# Patient Record
Sex: MALE | Race: WHITE | NOT HISPANIC OR LATINO | Employment: FULL TIME | RURAL
[De-identification: names, ages, dates, MRNs, and addresses within clinical notes are randomized per-mention and may not be internally consistent; named-entity substitution may affect disease eponyms.]

---

## 2021-05-03 ENCOUNTER — OFFICE VISIT (OUTPATIENT)
Dept: FAMILY MEDICINE | Facility: CLINIC | Age: 41
End: 2021-05-03
Payer: COMMERCIAL

## 2021-05-03 VITALS
HEIGHT: 72 IN | WEIGHT: 261 LBS | OXYGEN SATURATION: 97 % | BODY MASS INDEX: 35.35 KG/M2 | TEMPERATURE: 98 F | RESPIRATION RATE: 16 BRPM | SYSTOLIC BLOOD PRESSURE: 141 MMHG | DIASTOLIC BLOOD PRESSURE: 85 MMHG | HEART RATE: 69 BPM

## 2021-05-03 DIAGNOSIS — M10.9 GOUT, UNSPECIFIED CAUSE, UNSPECIFIED CHRONICITY, UNSPECIFIED SITE: Primary | ICD-10-CM

## 2021-05-03 DIAGNOSIS — R53.83 FATIGUE, UNSPECIFIED TYPE: ICD-10-CM

## 2021-05-03 LAB
25(OH)D3 SERPL-MCNC: 10.2 NG/ML
ALBUMIN SERPL BCP-MCNC: 3.8 G/DL (ref 3.5–5)
ALBUMIN/GLOB SERPL: 1 {RATIO}
ALP SERPL-CCNC: 72 U/L (ref 45–115)
ALT SERPL W P-5'-P-CCNC: 48 U/L (ref 16–61)
ANION GAP SERPL CALCULATED.3IONS-SCNC: 12 MMOL/L (ref 7–16)
AST SERPL W P-5'-P-CCNC: 22 U/L (ref 15–37)
BASOPHILS # BLD AUTO: 0.06 K/UL (ref 0–0.2)
BASOPHILS NFR BLD AUTO: 0.5 % (ref 0–1)
BILIRUB SERPL-MCNC: 0.5 MG/DL (ref 0–1.2)
BILIRUB UR QL STRIP: NEGATIVE
BUN SERPL-MCNC: 10 MG/DL (ref 7–18)
BUN/CREAT SERPL: 9 (ref 6–20)
CALCIUM SERPL-MCNC: 8.9 MG/DL (ref 8.5–10.1)
CHLORIDE SERPL-SCNC: 108 MMOL/L (ref 98–107)
CHOLEST SERPL-MCNC: 228 MG/DL (ref 0–200)
CHOLEST/HDLC SERPL: 4.7 {RATIO}
CLARITY UR: CLEAR
CO2 SERPL-SCNC: 24 MMOL/L (ref 21–32)
COLOR UR: YELLOW
CREAT SERPL-MCNC: 1.1 MG/DL (ref 0.7–1.3)
DIFFERENTIAL METHOD BLD: ABNORMAL
EOSINOPHIL # BLD AUTO: 0.13 K/UL (ref 0–0.5)
EOSINOPHIL NFR BLD AUTO: 1.1 % (ref 1–4)
ERYTHROCYTE [DISTWIDTH] IN BLOOD BY AUTOMATED COUNT: 13.2 % (ref 11.5–14.5)
GLOBULIN SER-MCNC: 4 G/DL (ref 2–4)
GLUCOSE SERPL-MCNC: 126 MG/DL (ref 74–106)
GLUCOSE UR STRIP-MCNC: 100 MG/DL
HCT VFR BLD AUTO: 45.9 % (ref 40–54)
HDLC SERPL-MCNC: 49 MG/DL (ref 40–60)
HGB BLD-MCNC: 14.9 G/DL (ref 13.5–18)
IMM GRANULOCYTES # BLD AUTO: 0.09 K/UL (ref 0–0.04)
IMM GRANULOCYTES NFR BLD: 0.7 % (ref 0–0.4)
KETONES UR STRIP-SCNC: NEGATIVE MG/DL
LDLC SERPL CALC-MCNC: 119 MG/DL
LDLC/HDLC SERPL: 2.4 {RATIO}
LEUKOCYTE ESTERASE UR QL STRIP: NEGATIVE
LYMPHOCYTES # BLD AUTO: 3.09 K/UL (ref 1–4.8)
LYMPHOCYTES NFR BLD AUTO: 25.6 % (ref 27–41)
MCH RBC QN AUTO: 28.7 PG (ref 27–31)
MCHC RBC AUTO-ENTMCNC: 32.5 G/DL (ref 32–36)
MCV RBC AUTO: 88.4 FL (ref 80–96)
MONOCYTES # BLD AUTO: 1.16 K/UL (ref 0–0.8)
MONOCYTES NFR BLD AUTO: 9.6 % (ref 2–6)
MPC BLD CALC-MCNC: 11.4 FL (ref 9.4–12.4)
NEUTROPHILS # BLD AUTO: 7.53 K/UL (ref 1.8–7.7)
NEUTROPHILS NFR BLD AUTO: 62.5 % (ref 53–65)
NITRITE UR QL STRIP: NEGATIVE
NONHDLC SERPL-MCNC: 179 MG/DL
NRBC # BLD AUTO: 0 X10E3/UL
NRBC, AUTO (.00): 0 %
PH UR STRIP: 5.5 PH UNITS
PLATELET # BLD AUTO: 255 K/UL (ref 150–400)
POTASSIUM SERPL-SCNC: 3.3 MMOL/L (ref 3.5–5.1)
PROT SERPL-MCNC: 7.8 G/DL (ref 6.4–8.2)
PROT UR QL STRIP: NEGATIVE
RBC # BLD AUTO: 5.19 M/UL (ref 4.6–6.2)
RBC # UR STRIP: NEGATIVE /UL
SODIUM SERPL-SCNC: 141 MMOL/L (ref 136–145)
SP GR UR STRIP: >=1.03
T4 FREE SERPL-MCNC: 0.77 NG/DL (ref 0.76–1.46)
TRIGL SERPL-MCNC: 298 MG/DL (ref 35–150)
TSH SERPL DL<=0.005 MIU/L-ACNC: 1.61 UIU/ML (ref 0.36–3.74)
UROBILINOGEN UR STRIP-ACNC: 0.2 MG/DL
VLDLC SERPL-MCNC: 60 MG/DL
WBC # BLD AUTO: 12.06 K/UL (ref 4.5–11)

## 2021-05-03 PROCEDURE — 84439 T4, FREE: ICD-10-PCS | Mod: ,,, | Performed by: CLINICAL MEDICAL LABORATORY

## 2021-05-03 PROCEDURE — 81003 URINALYSIS AUTO W/O SCOPE: CPT | Mod: QW,,, | Performed by: CLINICAL MEDICAL LABORATORY

## 2021-05-03 PROCEDURE — 81003 URINALYSIS: ICD-10-PCS | Mod: QW,,, | Performed by: CLINICAL MEDICAL LABORATORY

## 2021-05-03 PROCEDURE — 82306 VITAMIN D: ICD-10-PCS | Mod: ,,, | Performed by: CLINICAL MEDICAL LABORATORY

## 2021-05-03 PROCEDURE — 84403 TESTOSTERONE, FREE AND TOTAL: ICD-10-PCS | Mod: 90,,, | Performed by: CLINICAL MEDICAL LABORATORY

## 2021-05-03 PROCEDURE — 82306 VITAMIN D 25 HYDROXY: CPT | Mod: ,,, | Performed by: CLINICAL MEDICAL LABORATORY

## 2021-05-03 PROCEDURE — 80050 COMPREHENSIVE METABOLIC PANEL: ICD-10-PCS | Mod: ,,, | Performed by: CLINICAL MEDICAL LABORATORY

## 2021-05-03 PROCEDURE — 80061 LIPID PANEL: ICD-10-PCS | Mod: ,,, | Performed by: CLINICAL MEDICAL LABORATORY

## 2021-05-03 PROCEDURE — 80050 GENERAL HEALTH PANEL: CPT | Mod: ,,, | Performed by: CLINICAL MEDICAL LABORATORY

## 2021-05-03 PROCEDURE — 84403 ASSAY OF TOTAL TESTOSTERONE: CPT | Mod: 90,,, | Performed by: CLINICAL MEDICAL LABORATORY

## 2021-05-03 PROCEDURE — 84402 TESTOSTERONE, FREE AND TOTAL: ICD-10-PCS | Mod: 90,,, | Performed by: CLINICAL MEDICAL LABORATORY

## 2021-05-03 PROCEDURE — 84402 ASSAY OF FREE TESTOSTERONE: CPT | Mod: 90,,, | Performed by: CLINICAL MEDICAL LABORATORY

## 2021-05-03 PROCEDURE — 80061 LIPID PANEL: CPT | Mod: ,,, | Performed by: CLINICAL MEDICAL LABORATORY

## 2021-05-03 PROCEDURE — 84439 ASSAY OF FREE THYROXINE: CPT | Mod: ,,, | Performed by: CLINICAL MEDICAL LABORATORY

## 2021-05-03 RX ORDER — PREDNISONE 20 MG/1
20 TABLET ORAL DAILY
COMMUNITY
End: 2021-05-03 | Stop reason: SDUPTHER

## 2021-05-03 RX ORDER — IBUPROFEN 800 MG/1
800 TABLET ORAL 3 TIMES DAILY
COMMUNITY
End: 2021-05-03 | Stop reason: SDUPTHER

## 2021-05-03 RX ORDER — IBUPROFEN 800 MG/1
800 TABLET ORAL 3 TIMES DAILY
Qty: 20 TABLET | Refills: 2 | Status: SHIPPED | OUTPATIENT
Start: 2021-05-03

## 2021-05-03 RX ORDER — COLCHICINE 0.6 MG/1
0.6 TABLET ORAL DAILY
COMMUNITY
End: 2021-05-03 | Stop reason: SDUPTHER

## 2021-05-03 RX ORDER — PREDNISONE 20 MG/1
20 TABLET ORAL DAILY
Qty: 10 TABLET | Refills: 2 | Status: SHIPPED | OUTPATIENT
Start: 2021-05-03

## 2021-05-03 RX ORDER — COLCHICINE 0.6 MG/1
0.6 TABLET ORAL DAILY
Qty: 20 TABLET | Refills: 2 | Status: SHIPPED | OUTPATIENT
Start: 2021-05-03

## 2021-05-06 LAB
TESTOST FREE SERPL-MCNC: 4.49 NG/DL (ref 4.46–17.1)
TESTOST SERPL-MCNC: 136 NG/DL (ref 240–950)

## 2021-06-29 ENCOUNTER — OFFICE VISIT (OUTPATIENT)
Dept: FAMILY MEDICINE | Facility: CLINIC | Age: 41
End: 2021-06-29
Payer: COMMERCIAL

## 2021-06-29 VITALS
HEART RATE: 78 BPM | SYSTOLIC BLOOD PRESSURE: 119 MMHG | HEIGHT: 72 IN | WEIGHT: 258 LBS | OXYGEN SATURATION: 96 % | DIASTOLIC BLOOD PRESSURE: 77 MMHG | RESPIRATION RATE: 16 BRPM | BODY MASS INDEX: 34.95 KG/M2 | TEMPERATURE: 99 F

## 2021-06-29 DIAGNOSIS — D72.829 LEUKOCYTOSIS, UNSPECIFIED TYPE: ICD-10-CM

## 2021-06-29 DIAGNOSIS — E55.9 VITAMIN D DEFICIENCY: Primary | ICD-10-CM

## 2021-06-29 DIAGNOSIS — R79.89 LOW TESTOSTERONE: ICD-10-CM

## 2021-06-29 DIAGNOSIS — E87.6 HYPOKALEMIA: ICD-10-CM

## 2021-06-29 LAB
ALBUMIN SERPL BCP-MCNC: 4.1 G/DL (ref 3.5–5)
ALBUMIN/GLOB SERPL: 1.1 {RATIO}
ALP SERPL-CCNC: 65 U/L (ref 45–115)
ALT SERPL W P-5'-P-CCNC: 39 U/L (ref 16–61)
ANION GAP SERPL CALCULATED.3IONS-SCNC: 9 MMOL/L (ref 7–16)
AST SERPL W P-5'-P-CCNC: 20 U/L (ref 15–37)
BASOPHILS # BLD AUTO: 0.05 K/UL (ref 0–0.2)
BASOPHILS NFR BLD AUTO: 0.6 % (ref 0–1)
BILIRUB SERPL-MCNC: 0.6 MG/DL (ref 0–1.2)
BUN SERPL-MCNC: 12 MG/DL (ref 7–18)
BUN/CREAT SERPL: 10 (ref 6–20)
CALCIUM SERPL-MCNC: 9 MG/DL (ref 8.5–10.1)
CHLORIDE SERPL-SCNC: 109 MMOL/L (ref 98–107)
CO2 SERPL-SCNC: 27 MMOL/L (ref 21–32)
CREAT SERPL-MCNC: 1.18 MG/DL (ref 0.7–1.3)
DIFFERENTIAL METHOD BLD: ABNORMAL
EOSINOPHIL # BLD AUTO: 0.13 K/UL (ref 0–0.5)
EOSINOPHIL NFR BLD AUTO: 1.5 % (ref 1–4)
ERYTHROCYTE [DISTWIDTH] IN BLOOD BY AUTOMATED COUNT: 13.1 % (ref 11.5–14.5)
GLOBULIN SER-MCNC: 3.9 G/DL (ref 2–4)
GLUCOSE SERPL-MCNC: 95 MG/DL (ref 74–106)
HCT VFR BLD AUTO: 47 % (ref 40–54)
HGB BLD-MCNC: 15 G/DL (ref 13.5–18)
IMM GRANULOCYTES # BLD AUTO: 0.04 K/UL (ref 0–0.04)
IMM GRANULOCYTES NFR BLD: 0.5 % (ref 0–0.4)
LYMPHOCYTES # BLD AUTO: 3.06 K/UL (ref 1–4.8)
LYMPHOCYTES NFR BLD AUTO: 35.2 % (ref 27–41)
MCH RBC QN AUTO: 28.2 PG (ref 27–31)
MCHC RBC AUTO-ENTMCNC: 31.9 G/DL (ref 32–36)
MCV RBC AUTO: 88.5 FL (ref 80–96)
MONOCYTES # BLD AUTO: 0.81 K/UL (ref 0–0.8)
MONOCYTES NFR BLD AUTO: 9.3 % (ref 2–6)
MPC BLD CALC-MCNC: 11.2 FL (ref 9.4–12.4)
NEUTROPHILS # BLD AUTO: 4.6 K/UL (ref 1.8–7.7)
NEUTROPHILS NFR BLD AUTO: 52.9 % (ref 53–65)
NRBC # BLD AUTO: 0 X10E3/UL
NRBC, AUTO (.00): 0 %
PLATELET # BLD AUTO: 271 K/UL (ref 150–400)
POTASSIUM SERPL-SCNC: 4.3 MMOL/L (ref 3.5–5.1)
PROT SERPL-MCNC: 8 G/DL (ref 6.4–8.2)
RBC # BLD AUTO: 5.31 M/UL (ref 4.6–6.2)
SODIUM SERPL-SCNC: 141 MMOL/L (ref 136–145)
WBC # BLD AUTO: 8.69 K/UL (ref 4.5–11)

## 2021-06-29 PROCEDURE — 99214 PR OFFICE/OUTPT VISIT, EST, LEVL IV, 30-39 MIN: ICD-10-PCS | Mod: 25,,, | Performed by: FAMILY MEDICINE

## 2021-06-29 PROCEDURE — 99214 OFFICE O/P EST MOD 30 MIN: CPT | Mod: 25,,, | Performed by: FAMILY MEDICINE

## 2021-06-29 PROCEDURE — 80053 COMPREHENSIVE METABOLIC PANEL: ICD-10-PCS | Mod: QW,,, | Performed by: CLINICAL MEDICAL LABORATORY

## 2021-06-29 PROCEDURE — 3008F BODY MASS INDEX DOCD: CPT | Mod: ,,, | Performed by: FAMILY MEDICINE

## 2021-06-29 PROCEDURE — 3008F PR BODY MASS INDEX (BMI) DOCUMENTED: ICD-10-PCS | Mod: ,,, | Performed by: FAMILY MEDICINE

## 2021-06-29 PROCEDURE — 96372 THER/PROPH/DIAG INJ SC/IM: CPT | Mod: ,,, | Performed by: FAMILY MEDICINE

## 2021-06-29 PROCEDURE — 96372 PR INJECTION,THERAP/PROPH/DIAG2ST, IM OR SUBCUT: ICD-10-PCS | Mod: ,,, | Performed by: FAMILY MEDICINE

## 2021-06-29 PROCEDURE — 1126F AMNT PAIN NOTED NONE PRSNT: CPT | Mod: ,,, | Performed by: FAMILY MEDICINE

## 2021-06-29 PROCEDURE — 85025 CBC WITH DIFFERENTIAL: ICD-10-PCS | Mod: QW,,, | Performed by: CLINICAL MEDICAL LABORATORY

## 2021-06-29 PROCEDURE — 1126F PR PAIN SEVERITY QUANTIFIED, NO PAIN PRESENT: ICD-10-PCS | Mod: ,,, | Performed by: FAMILY MEDICINE

## 2021-06-29 PROCEDURE — 80053 COMPREHEN METABOLIC PANEL: CPT | Mod: QW,,, | Performed by: CLINICAL MEDICAL LABORATORY

## 2021-06-29 PROCEDURE — 85025 COMPLETE CBC W/AUTO DIFF WBC: CPT | Mod: QW,,, | Performed by: CLINICAL MEDICAL LABORATORY

## 2021-06-29 RX ORDER — TESTOSTERONE CYPIONATE 200 MG/ML
200 INJECTION, SOLUTION INTRAMUSCULAR
Status: COMPLETED | OUTPATIENT
Start: 2021-06-29 | End: 2021-06-29

## 2021-06-29 RX ORDER — TESTOSTERONE CYPIONATE 200 MG/ML
200 INJECTION, SOLUTION INTRAMUSCULAR
Qty: 6 ML | Refills: 0 | Status: SHIPPED | OUTPATIENT
Start: 2021-06-29 | End: 2021-09-27

## 2021-06-29 RX ORDER — CHOLECALCIFEROL (VITAMIN D3) 1250 MCG
1 TABLET ORAL
Qty: 12 TABLET | Refills: 0 | Status: SHIPPED | OUTPATIENT
Start: 2021-06-29 | End: 2021-09-27

## 2021-06-29 RX ADMIN — TESTOSTERONE CYPIONATE 200 MG: 200 INJECTION, SOLUTION INTRAMUSCULAR at 11:06

## 2021-11-18 ENCOUNTER — HOSPITAL ENCOUNTER (EMERGENCY)
Facility: HOSPITAL | Age: 41
Discharge: HOME | End: 2021-11-18
Attending: EMERGENCY MEDICINE
Payer: COMMERCIAL

## 2021-11-18 VITALS
HEIGHT: 72 IN | SYSTOLIC BLOOD PRESSURE: 131 MMHG | HEART RATE: 66 BPM | OXYGEN SATURATION: 97 % | RESPIRATION RATE: 16 BRPM | BODY MASS INDEX: 31.83 KG/M2 | TEMPERATURE: 98 F | DIASTOLIC BLOOD PRESSURE: 93 MMHG | WEIGHT: 235 LBS

## 2021-11-18 DIAGNOSIS — M25.569 KNEE PAIN: ICD-10-CM

## 2021-11-18 DIAGNOSIS — R79.89 LOW TESTOSTERONE: ICD-10-CM

## 2021-11-18 DIAGNOSIS — E55.9 VITAMIN D DEFICIENCY: ICD-10-CM

## 2021-11-18 DIAGNOSIS — M25.562 ACUTE PAIN OF LEFT KNEE: Primary | ICD-10-CM

## 2021-11-18 LAB
ANION GAP SERPL CALCULATED.3IONS-SCNC: 11 MMOL/L (ref 7–16)
BASOPHILS # BLD AUTO: 0.06 K/UL (ref 0–0.2)
BASOPHILS NFR BLD AUTO: 0.6 % (ref 0–1)
BUN SERPL-MCNC: 22 MG/DL (ref 7–18)
BUN/CREAT SERPL: 17 (ref 6–20)
CALCIUM SERPL-MCNC: 8.1 MG/DL (ref 8.5–10.1)
CHLORIDE SERPL-SCNC: 106 MMOL/L (ref 98–107)
CO2 SERPL-SCNC: 27 MMOL/L (ref 21–32)
CREAT SERPL-MCNC: 1.32 MG/DL (ref 0.7–1.3)
DIFFERENTIAL METHOD BLD: ABNORMAL
EOSINOPHIL # BLD AUTO: 0.14 K/UL (ref 0–0.5)
EOSINOPHIL NFR BLD AUTO: 1.4 % (ref 1–4)
ERYTHROCYTE [DISTWIDTH] IN BLOOD BY AUTOMATED COUNT: 12.5 % (ref 11.5–14.5)
GLUCOSE SERPL-MCNC: 76 MG/DL (ref 74–106)
HCT VFR BLD AUTO: 46.8 % (ref 40–54)
HGB BLD-MCNC: 15.6 G/DL (ref 13.5–18)
IMM GRANULOCYTES # BLD AUTO: 0.03 K/UL (ref 0–0.04)
IMM GRANULOCYTES NFR BLD: 0.3 % (ref 0–0.4)
LYMPHOCYTES # BLD AUTO: 3.92 K/UL (ref 1–4.8)
LYMPHOCYTES NFR BLD AUTO: 37.9 % (ref 27–41)
MCH RBC QN AUTO: 28.1 PG (ref 27–31)
MCHC RBC AUTO-ENTMCNC: 33.3 G/DL (ref 32–36)
MCV RBC AUTO: 84.3 FL (ref 80–96)
MONOCYTES # BLD AUTO: 0.98 K/UL (ref 0–0.8)
MONOCYTES NFR BLD AUTO: 9.5 % (ref 2–6)
MPC BLD CALC-MCNC: 10.5 FL (ref 9.4–12.4)
NEUTROPHILS # BLD AUTO: 5.21 K/UL (ref 1.8–7.7)
NEUTROPHILS NFR BLD AUTO: 50.3 % (ref 53–65)
NRBC # BLD AUTO: 0 X10E3/UL
NRBC, AUTO (.00): 0 %
PLATELET # BLD AUTO: 199 K/UL (ref 150–400)
POTASSIUM SERPL-SCNC: 4 MMOL/L (ref 3.5–5.1)
RBC # BLD AUTO: 5.55 M/UL (ref 4.6–6.2)
SODIUM SERPL-SCNC: 140 MMOL/L (ref 136–145)
URATE SERPL-MCNC: 9.1 MG/DL (ref 3.5–7.2)
WBC # BLD AUTO: 10.34 K/UL (ref 4.5–11)

## 2021-11-18 PROCEDURE — 99284 EMERGENCY DEPT VISIT MOD MDM: CPT

## 2021-11-18 PROCEDURE — 36415 COLL VENOUS BLD VENIPUNCTURE: CPT | Performed by: EMERGENCY MEDICINE

## 2021-11-18 PROCEDURE — 99282 PR EMERGENCY DEPT VISIT,LEVEL II: ICD-10-PCS | Mod: ,,, | Performed by: NURSE PRACTITIONER

## 2021-11-18 PROCEDURE — 80048 BASIC METABOLIC PNL TOTAL CA: CPT | Performed by: EMERGENCY MEDICINE

## 2021-11-18 PROCEDURE — 85025 COMPLETE CBC W/AUTO DIFF WBC: CPT | Performed by: EMERGENCY MEDICINE

## 2021-11-18 PROCEDURE — 99282 EMERGENCY DEPT VISIT SF MDM: CPT | Mod: ,,, | Performed by: NURSE PRACTITIONER

## 2021-11-18 PROCEDURE — 84550 ASSAY OF BLOOD/URIC ACID: CPT | Performed by: EMERGENCY MEDICINE

## 2021-11-18 RX ORDER — LIDOCAINE HYDROCHLORIDE 10 MG/ML
10 INJECTION INFILTRATION; PERINEURAL
Status: DISCONTINUED | OUTPATIENT
Start: 2021-11-18 | End: 2021-11-18 | Stop reason: HOSPADM

## 2024-06-26 DIAGNOSIS — L98.9 SKIN LESION: Primary | ICD-10-CM

## 2024-10-11 ENCOUNTER — HOSPITAL ENCOUNTER (EMERGENCY)
Facility: HOSPITAL | Age: 44
Discharge: HOME | End: 2024-10-11
Payer: OTHER MISCELLANEOUS

## 2024-10-11 VITALS
TEMPERATURE: 98 F | SYSTOLIC BLOOD PRESSURE: 136 MMHG | WEIGHT: 243 LBS | DIASTOLIC BLOOD PRESSURE: 97 MMHG | HEART RATE: 93 BPM | RESPIRATION RATE: 18 BRPM | HEIGHT: 72 IN | BODY MASS INDEX: 32.91 KG/M2 | OXYGEN SATURATION: 97 %

## 2024-10-11 DIAGNOSIS — M25.469 SUPRAPATELLAR EFFUSION OF KNEE: ICD-10-CM

## 2024-10-11 DIAGNOSIS — R60.9 SWELLING: ICD-10-CM

## 2024-10-11 DIAGNOSIS — M79.604 RIGHT LEG PAIN: Primary | ICD-10-CM

## 2024-10-11 PROCEDURE — 99284 EMERGENCY DEPT VISIT MOD MDM: CPT | Mod: 25

## 2024-10-11 RX ORDER — MELOXICAM 7.5 MG/1
7.5 TABLET ORAL DAILY
Qty: 30 TABLET | Refills: 0 | Status: SHIPPED | OUTPATIENT
Start: 2024-10-11 | End: 2024-11-10

## 2024-10-11 RX ORDER — TIZANIDINE 2 MG/1
4 TABLET ORAL EVERY 8 HOURS PRN
Qty: 30 TABLET | Refills: 0 | Status: SHIPPED | OUTPATIENT
Start: 2024-10-11 | End: 2024-10-17

## 2024-10-11 NOTE — ED PROVIDER NOTES
Encounter Date: 10/11/2024       History     Chief Complaint   Patient presents with    Leg Swelling     PRESENTS TO ER WITH COMPLAINT OF PROGRESSIVE RIGTH CALF PAIN. HAD ANKLE INJURY IN SEPTEMBER      44-year-old male presents to ED with complaint of right leg pain.  Patient states that he injured his ankle on 09/30 while at work.  He states that he stepped off of a piece of equipment in his ankle rolled inward.  Patient reports that pain was more intense to ankle at the time.  Patient states that he was evaluated by a doctor in [place], [place].  Patient states that he was re-evaluated and swelling was noted from mid thigh down.  Patient states that he was inquired about knee swelling and states that he did not have any evaluated due to the pain in his ankle.  Patient reports his calf is swollen and cold.  Denies chest pain, shortness of breathe.    The history is provided by the patient.     Review of patient's allergies indicates:  No Known Allergies  Past Medical History:   Diagnosis Date    Gout      History reviewed. No pertinent surgical history.  No family history on file.  Social History     Tobacco Use    Smoking status: Every Day    Smokeless tobacco: Current     Types: Snuff   Substance Use Topics    Alcohol use: Yes     Comment: rarely    Drug use: Never     Review of Systems   Constitutional:  Negative for chills and fever.   Eyes:  Negative for photophobia and visual disturbance.   Respiratory:  Negative for cough and shortness of breath.    Cardiovascular:  Positive for leg swelling. Negative for chest pain.   Gastrointestinal:  Negative for nausea and vomiting.   Musculoskeletal:  Positive for arthralgias and gait problem.   Skin:  Negative for color change and wound.   Neurological:  Negative for dizziness and weakness.   Hematological:  Negative for adenopathy. Does not bruise/bleed easily.   Psychiatric/Behavioral:  Negative for agitation and confusion.    All other systems reviewed and are  negative.      Physical Exam     Initial Vitals   BP Pulse Resp Temp SpO2   10/11/24 1639 10/11/24 1639 10/11/24 1737 10/11/24 1639 10/11/24 1639   (!) 136/97 93 18 98.1 °F (36.7 °C) 97 %      MAP       --                Physical Exam    Nursing note and vitals reviewed.  Constitutional: He appears well-developed and well-nourished.   HENT:   Head: Normocephalic and atraumatic.   Eyes: EOM are normal. Pupils are equal, round, and reactive to light.   Neck: Neck supple.   Normal range of motion.  Cardiovascular:  Normal rate and regular rhythm.           No murmur heard.  Pulmonary/Chest: He has no wheezes. He has no rhonchi.   Abdominal: Abdomen is soft. He exhibits no distension. There is no abdominal tenderness.   Musculoskeletal:         General: Tenderness and edema present.      Cervical back: Normal range of motion and neck supple.      Right knee: Decreased range of motion. Tenderness present.      Right lower leg: Swelling and tenderness present.     Lymphadenopathy:     He has no cervical adenopathy.   Neurological: He is alert and oriented to person, place, and time. No cranial nerve deficit or sensory deficit.   Skin: Skin is warm and dry. Capillary refill takes less than 2 seconds.   Psychiatric: He has a normal mood and affect. Thought content normal.         Medical Screening Exam   See Full Note    ED Course   Procedures  Labs Reviewed - No data to display       Imaging Results              X-Ray Knee AP LAT with [place] Right (Final result)  Result time 10/11/24 18:42:52   Procedure changed from X-Ray Knee 3 View Right     Final result by Deacon Bustamante MD (10/11/24 18:42:52)                   Impression:      1. Small suprapatellar effusion, no acute displaced fracture or dislocation of the knee.      Electronically signed by: Deacon Bustamante MD  Date:    10/11/2024  Time:    18:42               Narrative:    EXAMINATION:  XR KNEE AP LAT WITH SUNRISE RIGHT    CLINICAL  HISTORY:  swelling;swelling;  Edema, unspecified    COMPARISON:  None    FINDINGS:  Three views right knee.    No acute displaced fracture or dislocation of the knee.  No radiopaque foreign body.  There is a small suprapatellar effusion.  There is subcutaneous edema.                                       US Lower Extremity Veins Right (Final result)  Result time 10/11/24 18:41:53      Final result by Deacon Bustamante MD (10/11/24 18:41:53)                   Impression:      No evidence of deep venous thrombosis in the right lower extremity.      Electronically signed by: Deacon Bustamante MD  Date:    10/11/2024  Time:    18:41               Narrative:    EXAMINATION:  US LOWER EXTREMITY VEINS RIGHT    CLINICAL HISTORY:  Edema, unspecified    TECHNIQUE:  Duplex and color flow Doppler evaluation and graded compression of the right lower extremity veins was performed.    COMPARISON:  None    FINDINGS:  Duplex and color flow Doppler evaluation does not reveal any evidence of acute venous thrombosis in the common femoral, superficial femoral, greater saphenous, popliteal, peroneal, anterior tibial and posterior tibial veins of the right lower extremity.  There is no reflux to suggest valvular incompetence.                                       Medications - No data to display  Medical Decision Making  44-year-old male presents to ED with complaint of right leg pain.  Patient states that he injured his ankle on 09/30 while at work.  He states that he stepped off of a piece of equipment in his ankle rolled inward.  Patient reports that pain was more intense to ankle at the time.  Patient states that he was evaluated by a doctor in [place], [place].  Patient states that he was re-evaluated and swelling was noted from mid thigh down.  Patient states that he was inquired about knee swelling and states that he did not have any evaluated due to the pain in his ankle.  Patient reports his calf is swollen and cold.  Denies chest  pain, shortness of breathe.    Diagnostics obtained/reviewed  Prescriptions, Ortho referral placed    Amount and/or Complexity of Data Reviewed  Radiology: ordered.     Details: Negative for DVT  Small suprapatellar effusion    Risk  Prescription drug management.                                      Clinical Impression:   Final diagnoses:  [R60.9] Swelling  [M79.604] Right leg pain (Primary)  [M25.469] Suprapatellar effusion of knee        ED Disposition Condition    Discharge Stable          ED Prescriptions       Medication Sig Dispense Start Date End Date Auth. Provider    tiZANidine (ZANAFLEX) 2 MG tablet Take 2 tablets (4 mg total) by mouth every 8 (eight) hours as needed. 30 tablet 10/11/2024 10/21/2024 Shaye Grimaldo FNP    meloxicam (MOBIC) 7.5 MG tablet Take 1 tablet (7.5 mg total) by mouth once daily. 30 tablet 10/11/2024 11/10/2024 Shaye Grimaldo FNP          Follow-up Information    None          Shaye Grimaldo FNP  10/11/24 3931

## 2024-10-17 ENCOUNTER — TELEPHONE (OUTPATIENT)
Dept: ORTHOPEDICS | Facility: CLINIC | Age: 44
End: 2024-10-17
Payer: COMMERCIAL

## 2024-10-17 RX ORDER — TIZANIDINE 2 MG/1
TABLET ORAL
Qty: 30 TABLET | Refills: 0 | Status: SHIPPED | OUTPATIENT
Start: 2024-10-17

## 2024-10-17 NOTE — TELEPHONE ENCOUNTER
----- Message from Tootie sent at 10/17/2024 12:53 PM CDT -----  Pt wants to speak with nurse about some questions he has. [phone].  Who Called: Iam Morillo    Caller is requesting assistance/information from provider's office.      Patient's Preferred Phone Number on File: [phone]   Best Call Back Number, if different:  Additional Information:

## 2024-10-17 NOTE — TELEPHONE ENCOUNTER
Called patient and scheduled him an appt with Dr. Cross for 10/28/24 @ 2:00p.m. I explained to him that if Dr. Corss had any cancellations on 10/23 I would call him and move up his appt. He voiced understanding.

## 2024-10-17 NOTE — TELEPHONE ENCOUNTER
----- Message from Tootie sent at 10/16/2024  2:23 PM CDT -----  Pt checking to see if  has reviewed work comp yet so he can get an appt. He states he can get MRI report sent to you from where he had it done at. States he needs to get in soon as he is supposed to go back to work next Thurs. He said something about he doesn't have any ligaments. [phone]  Who Called: Iam Morillo    Caller is requesting assistance/information from provider's office.        Patient's Preferred Phone Number on File: [phone]   Best Call Back Number, if different:  Additional Information:

## 2024-10-28 ENCOUNTER — OFFICE VISIT (OUTPATIENT)
Dept: ORTHOPEDICS | Facility: CLINIC | Age: 44
End: 2024-10-28
Payer: OTHER MISCELLANEOUS

## 2024-10-28 VITALS — BODY MASS INDEX: 32.91 KG/M2 | HEIGHT: 72 IN | WEIGHT: 243 LBS

## 2024-10-28 DIAGNOSIS — M25.469 SUPRAPATELLAR EFFUSION OF KNEE: ICD-10-CM

## 2024-10-28 DIAGNOSIS — S93.491A SPRAIN OF ANTERIOR TALOFIBULAR LIGAMENT OF RIGHT ANKLE, INITIAL ENCOUNTER: Primary | ICD-10-CM

## 2024-10-28 DIAGNOSIS — M79.604 RIGHT LEG PAIN: ICD-10-CM

## 2024-10-28 PROCEDURE — 99999 PR PBB SHADOW E&M-EST. PATIENT-LVL III: CPT | Mod: PBBFAC,,, | Performed by: ORTHOPAEDIC SURGERY

## 2024-10-28 PROCEDURE — 99213 OFFICE O/P EST LOW 20 MIN: CPT | Mod: PBBFAC | Performed by: ORTHOPAEDIC SURGERY

## 2024-10-28 PROCEDURE — 99203 OFFICE O/P NEW LOW 30 MIN: CPT | Mod: S$PBB,,, | Performed by: ORTHOPAEDIC SURGERY

## 2024-10-28 RX ORDER — HYDROCODONE BITARTRATE AND ACETAMINOPHEN 7.5; 325 MG/1; MG/1
1 TABLET ORAL EVERY 6 HOURS PRN
Qty: 28 TABLET | Refills: 0 | Status: SHIPPED | OUTPATIENT
Start: 2024-10-28

## 2024-11-07 ENCOUNTER — CLINICAL SUPPORT (OUTPATIENT)
Dept: REHABILITATION | Facility: HOSPITAL | Age: 44
End: 2024-11-07
Payer: OTHER MISCELLANEOUS

## 2024-11-07 ENCOUNTER — OFFICE VISIT (OUTPATIENT)
Dept: ORTHOPEDICS | Facility: CLINIC | Age: 44
End: 2024-11-07
Payer: COMMERCIAL

## 2024-11-07 DIAGNOSIS — S93.491A SPRAIN OF ANTERIOR TALOFIBULAR LIGAMENT OF RIGHT ANKLE, INITIAL ENCOUNTER: Primary | ICD-10-CM

## 2024-11-07 DIAGNOSIS — M25.561 RIGHT KNEE PAIN, UNSPECIFIED CHRONICITY: Primary | ICD-10-CM

## 2024-11-07 DIAGNOSIS — R26.2 DIFFICULTY WALKING: ICD-10-CM

## 2024-11-07 DIAGNOSIS — M25.469 SUPRAPATELLAR EFFUSION OF KNEE: ICD-10-CM

## 2024-11-07 DIAGNOSIS — M79.604 RIGHT LEG PAIN: ICD-10-CM

## 2024-11-07 DIAGNOSIS — M25.661 DECREASED ROM OF RIGHT KNEE: ICD-10-CM

## 2024-11-07 DIAGNOSIS — M25.671 STIFFNESS OF RIGHT ANKLE JOINT: ICD-10-CM

## 2024-11-07 PROCEDURE — 99213 OFFICE O/P EST LOW 20 MIN: CPT | Mod: S$PBB,,, | Performed by: NURSE PRACTITIONER

## 2024-11-07 PROCEDURE — 99213 OFFICE O/P EST LOW 20 MIN: CPT | Mod: PBBFAC | Performed by: NURSE PRACTITIONER

## 2024-11-07 PROCEDURE — 99999 PR PBB SHADOW E&M-EST. PATIENT-LVL III: CPT | Mod: PBBFAC,,, | Performed by: NURSE PRACTITIONER

## 2024-11-07 PROCEDURE — 97162 PT EVAL MOD COMPLEX 30 MIN: CPT

## 2024-11-07 PROCEDURE — 1159F MED LIST DOCD IN RCRD: CPT | Mod: ,,, | Performed by: NURSE PRACTITIONER

## 2024-11-07 PROCEDURE — 97110 THERAPEUTIC EXERCISES: CPT

## 2024-11-07 NOTE — PROGRESS NOTES
44 y.o. Male returns to clinic for a follow up visit regarding     ICD-10-CM ICD-9-CM   1. Right knee pain, unspecified chronicity  M25.561 719.46        Patient is here today complaining of right knee pain. He was seen by Dr barnhart after his injury. He is workers comp. He sustained a twisting injury to his right ankle when he was at work stepped on a piece of pipe rolled his ankle he has an ATFL injury per his MRI. There were no fractures of the ankle. He has been treated in a walking boot.  He is currently in formal PT.  Reports he has had swelling of his knee since his injury.  Reports he is having a good bit of pain in the back of knee at this time.  It is painful to walk on.  He is swollen today.  He has had no treatment of the knee.       Past Medical History:   Diagnosis Date    Gout      History reviewed. No pertinent surgical history.      PHYSICAL EXAMINATION:              Right Knee Exam     Inspection   Scars: absent  Swelling: present  Bruising: absent    Tenderness   The patient is tender to palpation of the medial joint line and lateral joint line.    Range of Motion   Extension:  normal   Flexion:  abnormal     Tests   Meniscus   Gal:  Medial - positive     Other   Sensation: normal    Comments:  Tenderness over quad tendon    Muscle Strength   Right Lower Extremity   Quadriceps:  4/5     Vascular Exam     Right Pulses  Dorsalis Pedis:      2+          IMAGING:  X-Ray Knee AP LAT with [place] Right    Result Date: 10/11/2024  EXAMINATION: XR KNEE AP LAT WITH SUNRISE RIGHT CLINICAL HISTORY: swelling;swelling;  Edema, unspecified COMPARISON: None FINDINGS: Three views right knee. No acute displaced fracture or dislocation of the knee.  No radiopaque foreign body.  There is a small suprapatellar effusion.  There is subcutaneous edema.     1. Small suprapatellar effusion, no acute displaced fracture or dislocation of the knee. Electronically signed by: Deacon Bustamante MD Date:    10/11/2024  Time:    18:42    US Lower Extremity Veins Right    Result Date: 10/11/2024  EXAMINATION: US LOWER EXTREMITY VEINS RIGHT CLINICAL HISTORY: Edema, unspecified TECHNIQUE: Duplex and color flow Doppler evaluation and graded compression of the right lower extremity veins was performed. COMPARISON: None FINDINGS: Duplex and color flow Doppler evaluation does not reveal any evidence of acute venous thrombosis in the common femoral, superficial femoral, greater saphenous, popliteal, peroneal, anterior tibial and posterior tibial veins of the right lower extremity.  There is no reflux to suggest valvular incompetence.     No evidence of deep venous thrombosis in the right lower extremity. Electronically signed by: Deacon Bustamante MD Date:    10/11/2024 Time:    18:41       ASSESSMENT:      ICD-10-CM ICD-9-CM   1. Right knee pain, unspecified chronicity  M25.561 719.46       PLAN:     -Findings and treatment options were discussed with the patient  -All questions answered      We will order MRI of his right knee and have him return to clinic with Dr. Cross after to discuss results    There are no Patient Instructions on file for this visit.      Orders Placed This Encounter   Procedures    MRI Knee Without Contrast Right         Procedures

## 2024-11-11 ENCOUNTER — CLINICAL SUPPORT (OUTPATIENT)
Dept: REHABILITATION | Facility: HOSPITAL | Age: 44
End: 2024-11-11
Payer: OTHER MISCELLANEOUS

## 2024-11-11 ENCOUNTER — OFFICE VISIT (OUTPATIENT)
Dept: DERMATOLOGY | Facility: CLINIC | Age: 44
End: 2024-11-11
Payer: COMMERCIAL

## 2024-11-11 VITALS — BODY MASS INDEX: 32.51 KG/M2 | HEIGHT: 72 IN | RESPIRATION RATE: 18 BRPM | WEIGHT: 240 LBS

## 2024-11-11 DIAGNOSIS — M25.661 DECREASED ROM OF RIGHT KNEE: ICD-10-CM

## 2024-11-11 DIAGNOSIS — M79.604 RIGHT LEG PAIN: ICD-10-CM

## 2024-11-11 DIAGNOSIS — L98.9 SKIN LESION: ICD-10-CM

## 2024-11-11 DIAGNOSIS — M25.671 STIFFNESS OF RIGHT ANKLE JOINT: ICD-10-CM

## 2024-11-11 DIAGNOSIS — S93.491D SPRAIN OF ANTERIOR TALOFIBULAR LIGAMENT OF RIGHT ANKLE, SUBSEQUENT ENCOUNTER: Primary | ICD-10-CM

## 2024-11-11 DIAGNOSIS — R26.2 DIFFICULTY WALKING: ICD-10-CM

## 2024-11-11 PROCEDURE — 99203 OFFICE O/P NEW LOW 30 MIN: CPT | Mod: ,,, | Performed by: DERMATOLOGY

## 2024-11-11 PROCEDURE — 1160F RVW MEDS BY RX/DR IN RCRD: CPT | Mod: ,,, | Performed by: DERMATOLOGY

## 2024-11-11 PROCEDURE — 97016 VASOPNEUMATIC DEVICE THERAPY: CPT

## 2024-11-11 PROCEDURE — 97110 THERAPEUTIC EXERCISES: CPT

## 2024-11-11 PROCEDURE — 3008F BODY MASS INDEX DOCD: CPT | Mod: ,,, | Performed by: DERMATOLOGY

## 2024-11-11 PROCEDURE — 1159F MED LIST DOCD IN RCRD: CPT | Mod: ,,, | Performed by: DERMATOLOGY

## 2024-11-11 PROCEDURE — 97014 ELECTRIC STIMULATION THERAPY: CPT

## 2024-11-11 NOTE — PROGRESS NOTES
[facility]   Physical Therapy Treatment Note      Name: Iam Morillo  Clinic Number: [id number]    Therapy Diagnosis: No diagnosis found.  Physician: Yonatan Cross MD    Visit Date: 11/11/2024    Physician Orders: PT Eval and Treat   Medical Diagnosis from Referral: see above  Evaluation Date: 11/7/2024  Authorization Period Expiration: 10/29/2026  Plan of Care Expiration: 01/31/2025     Date of Surgery: n/a  Visit # / Visits authorized: 1/ 24   FOTO: 1/ 3     Precautions: Standard        PTA Visit #: 0/5     Time In: 8:47 am  Time Out: 9:51 am  Total Billable Time: 64 minutes    Subjective     Pt reports: they would not draw the fluid off his knee last week - knee still hurts badly - not putting any weight on right lower extremity   He was compliant with home exercise program.  Response to previous treatment: no complaints   Functional change: no change noted    Pain: 8/10  Location: right knee and ankle    Objective      20-65 degrees right knee range of motion     Treatment     Iam received the treatments listed below:      therapeutic exercises to develop strength, endurance, ROM, flexibility, posture, and core stabilization for 25 minutes including:  NuStep x 6 minutes  Ankle dorsiflexion/plantarflexion x 30   Ankle eversion/inversion x 30  Seated wobble board plantarflexion/dorsiflexion x 30  Seated heel and toe raises x 30 each      manual therapy techniques: Joint mobilizations, Manual traction, Myofacial release, Soft tissue Mobilization, and Friction Massage were applied for 0 minutes, including:  -    neuromuscular re-education activities to improve: Balance, Coordination, Kinesthetic Sense, Proprioception, and Posture for 0 minutes. The following activities were included:  -    therapeutic activities to improve functional performance for 0  minutes, including:  -    gait training to improve functional mobility and safety for 0  minutes, including:  - will not  put any weight on right lower extremity     direct contact modalities after being cleared for contraindications:     supervised modalities after being cleared for contradictions:   IFC Electrical Stimulation:  Iam received IFC Electrical Stimulation for pain control applied to the right knee. Pt received stimulation at 50 % scan at a frequency of  for 15 minutes. Iam tolderated treatment well without any adverse effects.      NMES Electrical Stimulation:  Iam received NMES Electrical Stimulation to elicit muscle contraction of the right quad. Pt received stimulation at a rate of 50 pps with symmetric current, ramp of 2 seconds with 10 second on time and 20 second off time. Patient tolerated treatment well without any adverse effects. (Not today)    Vasopneumatic/Game Ready to right knee for 15 minutes - low compression - end of bed elevated - in conjunction with IFC - to decrease pain and swelling    biofeedback to isolate quad contraction, decrease cocontraction, and assist with neuromuscular reeducation for 0 minutes. Exercises performed with biofeedback Including: -      Patient Education and Home Exercises       Education provided:   - review of home exercise program and current Plan of Care/rationale of treatment.    Written Home Exercises Provided: Patient instructed to cont prior HEP. Exercises were reviewed and Iam was able to demonstrate them prior to the end of the session.  Iam demonstrated good understanding of the education provided. See EMR under Patient Instructions for exercises provided during therapy sessions    Assessment     At Evaluation:  Iam is a 44 y.o. male referred to outpatient Physical Therapy with a medical diagnosis of tear of anterior talofibular ligament in right ankle. Patient presents with significant pain, swelling and loss of range of motion in right ankle. He is in a tall walking boot. He also has significant pain, swelling and decreased range of  motion in right knee as well. He states the knee is the reason he can't put any weight on the right lower extremity despite being weight bearing as tolerated per MD orders. He says he is going to get the fluid drawn off later today. Reports h/o right knee surgery in 2012 for torn meniscus. He has an MRI confirmed tear of the anterior talofibular ligament in the right ankle. He has had no advanced imaging of the knee. He states his knee has done this before and drawing the fluid off helped so he is hopeful his knee symptoms will resolve. Iam is a  for [email or web address] so he has a labor intensive job to return to which will require extensive therapy.     Current Assessment:  Iam arrived for first visit following evaluation. He was late due to the fact that he says someone called him Friday and told him his MRI on his knee was at 830 this morning but it has still not been approved by work comp. He is very limited in therapy due to the pain in his knee. He states they would not draw the fluid off of it last week like he had hoped they would. He will not put any weight on the right lower extremity - says he is unable to due to the knee pain. He cannot straighten the knee and has a lot of pain with any movement of the knee. We were very limited today trying to work on the ankle because of the knee. Ended treatment with IFC and Game Ready to knee to try to decrease pain and inflammation.     Iam Is progressing towards his goals.   Pt prognosis is Good.     Pt will continue to benefit from skilled outpatient physical therapy to address the deficits listed in the problem list box on initial evaluation, provide pt/family education and to maximize pt's level of independence in the home and community environment.     Pt's spiritual, cultural and educational needs considered and pt agreeable to plan of care and goals.     Anticipated barriers to physical therapy: none    Goals:  SHORT TERM  GOALS  Patient to be independent with home exercise program to facilitate carryover between therapy visits.  Patient will have +5 degrees dorsiflexion, 15 degrees inversion and 10 degrees eversion range of motion right ankle for improved gait and functional mobility.  Patient will have 0-120 degrees range of motion right knee for improved gait and functional mobility.  Patient will increase manual muscle test of right ankle and knee to 4+ to 5/5 for increased stability with gait and activiites of daily living.     LONG TERM GOALS  Patient will go up/down stairs reciprocal pattern without handrails and up/down a ladder and with good eccentric control on right lower extremity.  Patient will ambulate independent without assistive device, without deviation and without pain in right knee or ankle.  Patient will be able to return to work full duty as a  for [employer].       Plan     Plan of care Certification: 11/7/2024 to 01/31/2025.     Outpatient Physical Therapy 3 times weekly for 4 weeks, then 2 times weekly for 6 weeks to include the following interventions: 20890 [therapeutic exercise], 59971 [neuromuscular re-education], 91096 [gait training], 01489 [manual therapy], 50717 [therapeutic activities], 76742 [unattended electrical stimulation], 07570 [biofeedback by any modality], and 17589 [vasopneumatic device].     TATE LUTHER, PT   11/11/2024

## 2024-11-11 NOTE — PROGRESS NOTES
[facility]   Sindhu Chan MD    Patient Name: Iam Morillo  Patient YOB: 1980   Date of Service: 11/11/24    CC: Lesion    HPI: Iam Morillo is a 44 y.o. male here today for lesion, located on the right eye.  Lesion has been present for 1 months.  Previous treatments include no treatment.  Patient is also concerned today about lesion located on the posterior scalp.    Past Medical History:   Diagnosis Date    Gout      History reviewed. No pertinent surgical history.  Review of patient's allergies indicates:  No Known Allergies    Current Outpatient Medications:     colchicine (COLCRYS) 0.6 mg tablet, Take 1 tablet (0.6 mg total) by mouth once daily., Disp: 20 tablet, Rfl: 2    HYDROcodone-acetaminophen (NORCO) 7.5-325 mg per tablet, Take 1 tablet by mouth every 6 (six) hours as needed for Pain., Disp: 28 tablet, Rfl: 0    ibuprofen (ADVIL,MOTRIN) 800 MG tablet, Take 1 tablet (800 mg total) by mouth 3 (three) times daily., Disp: 20 tablet, Rfl: 2    predniSONE (DELTASONE) 20 MG tablet, Take 1 tablet (20 mg total) by mouth once daily., Disp: 10 tablet, Rfl: 2    tiZANidine (ZANAFLEX) 2 MG tablet, TAKE 2 TABLETS(4 MG) BY MOUTH EVERY 8 HOURS AS NEEDED, Disp: 30 tablet, Rfl: 0    ROS: A focused review of systems was obtained and negative.     Exam: A focused skin exam was performed. All areas examined were normal except as mentioned in the assessment and plan below.  General Appearance of the patient is well developed and well nourished.  Orientation: alert and oriented x 3.  Mood and affect: pleasant.    Assessment:   The encounter diagnosis was Skin lesion.    Plan:      Epidermal Cyst  - subcutaneous cyst with prominent follicular pore located on the right nasal bridge and right posterior scalp    Plan: Counseling  I counseled the patient regarding the following:  Skin Care: Epidermal Cysts require no specific skin care.  Expectations: Epidermal Cysts are benign sacs within the skin  that contain keratin.  Contact Office if: Epidermal Cysts rupture or become red and tender.  - Will refer to Dr. Gutierrez for excision, expectations discussed     Follow up if symptoms worsen or fail to improve.    Sindhu Chan MD

## 2024-11-12 PROBLEM — M79.604 RIGHT LEG PAIN: Status: ACTIVE | Noted: 2024-11-12

## 2024-11-12 PROBLEM — M25.661 DECREASED ROM OF RIGHT KNEE: Status: ACTIVE | Noted: 2024-11-12

## 2024-11-12 PROBLEM — M25.671 STIFFNESS OF RIGHT ANKLE JOINT: Status: ACTIVE | Noted: 2024-11-12

## 2024-11-12 PROBLEM — R26.2 DIFFICULTY WALKING: Status: ACTIVE | Noted: 2024-11-12

## 2024-11-12 PROBLEM — M25.469 SUPRAPATELLAR EFFUSION OF KNEE: Status: ACTIVE | Noted: 2024-11-12

## 2024-11-13 ENCOUNTER — CLINICAL SUPPORT (OUTPATIENT)
Dept: REHABILITATION | Facility: HOSPITAL | Age: 44
End: 2024-11-13
Payer: OTHER MISCELLANEOUS

## 2024-11-13 DIAGNOSIS — M25.671 STIFFNESS OF RIGHT ANKLE JOINT: ICD-10-CM

## 2024-11-13 DIAGNOSIS — M79.604 RIGHT LEG PAIN: ICD-10-CM

## 2024-11-13 DIAGNOSIS — R26.2 DIFFICULTY WALKING: ICD-10-CM

## 2024-11-13 DIAGNOSIS — M25.661 DECREASED ROM OF RIGHT KNEE: ICD-10-CM

## 2024-11-13 DIAGNOSIS — S93.491D SPRAIN OF ANTERIOR TALOFIBULAR LIGAMENT OF RIGHT ANKLE, SUBSEQUENT ENCOUNTER: Primary | ICD-10-CM

## 2024-11-13 PROCEDURE — 97140 MANUAL THERAPY 1/> REGIONS: CPT

## 2024-11-13 PROCEDURE — 97110 THERAPEUTIC EXERCISES: CPT

## 2024-11-13 NOTE — PROGRESS NOTES
[facility]   Physical Therapy Treatment Note      Name: Iam Morillo  Clinic Number: [id number]    Therapy Diagnosis:   Encounter Diagnoses   Name Primary?    Sprain of anterior talofibular ligament of right ankle, subsequent encounter Yes    Right leg pain     Difficulty walking     Decreased ROM of right knee     Stiffness of right ankle joint      Physician: Yonatan Cross MD    Visit Date: 11/13/2024    Physician Orders: PT Eval and Treat   Medical Diagnosis from Referral: see above  Evaluation Date: 11/7/2024  Authorization Period Expiration: 10/29/2026  Plan of Care Expiration: 01/31/2025     Date of Surgery: n/a  Visit # / Visits authorized: 3/24   FOTO: 1/ 3     Precautions: Standard        PTA Visit #: 0/5     Time In: 11:31 am  Time Out: 12:12 pm  Total Billable Time: 41 minutes    Subjective     Pt reports: knee continues to hurt worse - has been doing ankle ex's   He was compliant with home exercise program.  Response to previous treatment: no complaints   Functional change: no change noted    Pain: 8/10  Location: right knee and ankle    Objective      20-65 degrees right knee range of motion     Treatment     Iam received the treatments listed below:      therapeutic exercises to develop strength, endurance, ROM, flexibility, posture, and core stabilization for 33 minutes including:  NuStep x 5 minutes  Hamstring rolls x 10  Ankle dorsiflexion/plantarflexion x 30  Ankle eversion/inversion -  Seated wobble board - plantarflexion/dorsiflexion x 30  MRE - 4 way ankle - 3sh x 20 each  Seated dorsiflexion x 30  Seated plantarflexion x 30  Seated calf stretch - soleus 5 x 20sh      manual therapy techniques: Joint mobilizations, Manual traction, Myofacial release, Soft tissue Mobilization, and Friction Massage were applied for 8 minutes, including:  Gentle passive range of motion to ankle all 4 directions    neuromuscular re-education activities to improve: Balance,  Coordination, Kinesthetic Sense, Proprioception, and Posture for 0 minutes. The following activities were included:  -    therapeutic activities to improve functional performance for 0  minutes, including:  -    gait training to improve functional mobility and safety for 0  minutes, including:  - will not put any weight on right lower extremity     direct contact modalities after being cleared for contraindications:     supervised modalities after being cleared for contradictions:   IFC Electrical Stimulation:  Iam received IFC Electrical Stimulation for pain control applied to the right knee. Pt received stimulation at 50 % scan at a frequency of  for 0 minutes. Iam tolderated treatment well without any adverse effects.      NMES Electrical Stimulation:  Iam received NMES Electrical Stimulation to elicit muscle contraction of the right quad. Pt received stimulation at a rate of 50 pps with symmetric current, ramp of 2 seconds with 10 second on time and 20 second off time. Patient tolerated treatment well without any adverse effects. (Not today)    Vasopneumatic/Game Ready to right knee for 0 minutes - low compression - end of bed elevated - in conjunction with IFC - to decrease pain and swelling    biofeedback to isolate quad contraction, decrease cocontraction, and assist with neuromuscular reeducation for 0 minutes. Exercises performed with biofeedback Including: -      Patient Education and Home Exercises       Education provided:   - review of home exercise program and current Plan of Care/rationale of treatment.    Written Home Exercises Provided: Patient instructed to cont prior HEP. Exercises were reviewed and Iam was able to demonstrate them prior to the end of the session.  Iam demonstrated good understanding of the education provided. See EMR under Patient Instructions for exercises provided during therapy sessions    Assessment     At Evaluation: Iam is a 44 y.o. male  referred to outpatient Physical Therapy with a medical diagnosis of tear of anterior talofibular ligament in right ankle. Patient presents with significant pain, swelling and loss of range of motion in right ankle. He is in a tall walking boot. He also has significant pain, swelling and decreased range of motion in right knee as well. He states the knee is the reason he can't put any weight on the right lower extremity despite being weight bearing as tolerated per MD orders. He says he is going to get the fluid drawn off later today. Reports h/o right knee surgery in 2012 for torn meniscus. He has an MRI confirmed tear of the anterior talofibular ligament in the right ankle. He has had no advanced imaging of the knee. He states his knee has done this before and drawing the fluid off helped so he is hopeful his knee symptoms will resolve. Iam is a  for [employer] so he has a labor intensive job to return to which will require extensive therapy.       Current Assessment:  Iam arrived without improvement in knee pain. He is frustrated because his MRI is still not approved and that they would not draw the fluid off of his knee. We are still very limited today trying to work on the ankle because of the knee. Although he says the ankle is not hurting him too bad it remains very swollen and hypomobile. He is still not putting weight on the right lower extremity due to the knee pain. Will continue current plan of care and progress as tolerated.    Iam Is progressing towards his goals.   Pt prognosis is Good.     Pt will continue to benefit from skilled outpatient physical therapy to address the deficits listed in the problem list box on initial evaluation, provide pt/family education and to maximize pt's level of independence in the home and community environment.     Pt's spiritual, cultural and educational needs considered and pt agreeable to plan of care and goals.     Anticipated  barriers to physical therapy: none    Goals:  SHORT TERM GOALS  Patient to be independent with home exercise program to facilitate carryover between therapy visits.  Patient will have +5 degrees dorsiflexion, 15 degrees inversion and 10 degrees eversion range of motion right ankle for improved gait and functional mobility.  Patient will have 0-120 degrees range of motion right knee for improved gait and functional mobility.  Patient will increase manual muscle test of right ankle and knee to 4+ to 5/5 for increased stability with gait and activiites of daily living.     LONG TERM GOALS  Patient will go up/down stairs reciprocal pattern without handrails and up/down a ladder and with good eccentric control on right lower extremity.  Patient will ambulate independent without assistive device, without deviation and without pain in right knee or ankle.  Patient will be able to return to work full duty as a  for [employer].       Plan     Plan of care Certification: 11/7/2024 to 01/31/2025.     Outpatient Physical Therapy 3 times weekly for 4 weeks, then 2 times weekly for 6 weeks to include the following interventions: 87974 [therapeutic exercise], 26709 [neuromuscular re-education], 41113 [gait training], 37781 [manual therapy], 63655 [therapeutic activities], 50971 [unattended electrical stimulation], 93184 [biofeedback by any modality], and 79519 [vasopneumatic device].     TATE LUTHER, PT   11/13/2024

## 2024-11-14 ENCOUNTER — TELEPHONE (OUTPATIENT)
Dept: ORTHOPEDICS | Facility: CLINIC | Age: 44
End: 2024-11-14
Payer: COMMERCIAL

## 2024-11-14 NOTE — TELEPHONE ENCOUNTER
----- Message from Gemma sent at 11/14/2024 12:23 PM CST -----  Regarding: Wants to Reschedule MRI  Who Called: Iam Morillo    Caller is requesting assistance/information from provider's office.    Said worker's comp approved his MRI. I see where one is scheduled for 12/3 but he said he cannot wait that long because he is in pain.     Preferred Method of Contact: Phone Call  Patient's Preferred Phone Number on File: [phone]

## 2024-11-15 NOTE — TELEPHONE ENCOUNTER
MRI is approved; appointment rescheduled for 11/18 at 8:30  Once we received these results we will give him a call back regarding a follow up appt    ----- Message from Angel Sinclair sent at 11/15/2024 11:21 AM CST -----  Regarding: FW: MRI Appointment    ----- Message -----  From: Gemma Woods  Sent: 11/15/2024  11:14 AM CST  To: [facility] Staff  Subject: MRI Appointment                                  Who Called: Iam Morillo     Caller is requesting assistance/information from provider's office.     He said worker's comp approved his MRI - He has an appointment scheduled for the 3rd, but he called again and is frustrated because he has not heard anything back about getting a sooner appointment.      Preferred Method of Contact: Phone Call  Patient's Preferred Phone Number on File: [phone]

## 2024-11-18 ENCOUNTER — CLINICAL SUPPORT (OUTPATIENT)
Dept: REHABILITATION | Facility: HOSPITAL | Age: 44
End: 2024-11-18
Payer: OTHER MISCELLANEOUS

## 2024-11-18 ENCOUNTER — HOSPITAL ENCOUNTER (OUTPATIENT)
Dept: RADIOLOGY | Facility: HOSPITAL | Age: 44
Discharge: HOME | End: 2024-11-18
Attending: NURSE PRACTITIONER
Payer: OTHER MISCELLANEOUS

## 2024-11-18 DIAGNOSIS — M79.604 RIGHT LEG PAIN: ICD-10-CM

## 2024-11-18 DIAGNOSIS — S93.491D SPRAIN OF ANTERIOR TALOFIBULAR LIGAMENT OF RIGHT ANKLE, SUBSEQUENT ENCOUNTER: Primary | ICD-10-CM

## 2024-11-18 DIAGNOSIS — M25.661 DECREASED ROM OF RIGHT KNEE: ICD-10-CM

## 2024-11-18 DIAGNOSIS — M25.561 RIGHT KNEE PAIN, UNSPECIFIED CHRONICITY: ICD-10-CM

## 2024-11-18 DIAGNOSIS — M25.671 STIFFNESS OF RIGHT ANKLE JOINT: ICD-10-CM

## 2024-11-18 DIAGNOSIS — R26.2 DIFFICULTY WALKING: ICD-10-CM

## 2024-11-18 PROCEDURE — 97016 VASOPNEUMATIC DEVICE THERAPY: CPT | Mod: CQ

## 2024-11-18 PROCEDURE — 97112 NEUROMUSCULAR REEDUCATION: CPT | Mod: CQ

## 2024-11-18 PROCEDURE — 73721 MRI JNT OF LWR EXTRE W/O DYE: CPT | Mod: 26,RT,, | Performed by: RADIOLOGY

## 2024-11-18 PROCEDURE — 97140 MANUAL THERAPY 1/> REGIONS: CPT | Mod: CQ

## 2024-11-18 PROCEDURE — 97110 THERAPEUTIC EXERCISES: CPT | Mod: CQ

## 2024-11-18 PROCEDURE — 73721 MRI JNT OF LWR EXTRE W/O DYE: CPT | Mod: TC,RT

## 2024-11-18 NOTE — PROGRESS NOTES
[facility]   Physical Therapy Treatment Note      Name: Iam Morillo  Clinic Number: [id number]    Therapy Diagnosis:   Encounter Diagnoses   Name Primary?    Sprain of anterior talofibular ligament of right ankle, subsequent encounter Yes    Right leg pain     Difficulty walking     Decreased ROM of right knee     Stiffness of right ankle joint      Physician: Yonatan Cross MD    Visit Date: 11/18/2024    Physician Orders: PT Eval and Treat   Medical Diagnosis from Referral: see above  Evaluation Date: 11/7/2024  Authorization Period Expiration: 10/29/2026  Plan of Care Expiration: 01/31/2025     Date of Surgery: n/a  Visit # / Visits authorized: 3/ 24   FOTO: 1/ 3 = 38     Precautions: Standard     PTA Visit #: 1/5     Time In: 10:01 am  Time Out: 10:52 pm  Total Billable Time: 50 minutes    Subjective     Pt reports: knee is not as bad as it was but it still hurts.   He was compliant with home exercise program.  Response to previous treatment: no complaints   Functional change: no change noted    Pain: 3/10 at rest, 7-8/10 with movement    Location: right knee and ankle    Objective      17-75 degrees right knee range of motion   Case conference with Emerita Roberson PT, for initial PTA visit.     Treatment     Iam received the treatments listed below:      therapeutic exercises to develop strength, endurance, ROM, flexibility, posture, and core stabilization for 23 minutes including:  NuStep -  Hamstring rolls x 20  Ankle dorsiflexion/plantarflexion   Ankle eversion/inversion   Seated dorsiflexion x 30  Seated plantarflexion x 30  Seated calf stretch - soleus 5 x 20sh    manual therapy techniques: Joint mobilizations, Manual traction, Myofacial release, Soft tissue Mobilization, and Friction Massage were applied to the: ankle for 9 minutes, including:  Gentle stretching in all planes and light desensitization over anterior ankle    neuromuscular re-education activities to  improve: Balance, Coordination, Kinesthetic Sense, Proprioception, and Posture for 8 minutes. The following activities were included:  Seated wobble board plantarflexion/dorsiflexion x 30  MRE - 4 way ankle - 3sh x 20 each  Short foot x 2 minutes     therapeutic activities to improve functional performance for 0  minutes, including:  (not performed today)     gait training to improve functional mobility and safety for 0  minutes, including:  - will not put any weight on right lower extremity     direct contact modalities after being cleared for contraindications:     supervised modalities after being cleared for contradictions:   IFC Electrical Stimulation:  Iam received IFC Electrical Stimulation for pain control applied to the right knee. Pt received stimulation at 50 % scan at a frequency of  for 0 minutes. Iam tolderated treatment well without any adverse effects.      NMES Electrical Stimulation:  Iam received NMES Electrical Stimulation to elicit muscle contraction of the right quad. Pt received stimulation at a rate of 50 pps with symmetric current, ramp of 2 seconds with 10 second on time and 20 second off time. Patient tolerated treatment well without any adverse effects. (Not today)    Vasopneumatic/Game Ready to right ankle for 10 minutes - high compression - end of bed elevated - to decrease pain and swelling    biofeedback to isolate quad contraction, decrease cocontraction, and assist with neuromuscular reeducation for 0 minutes. Exercises performed with biofeedback Including: (not performed today)       Patient Education and Home Exercises       Education provided:   - review of home exercise program and current Plan of Care/rationale of treatment.    Written Home Exercises Provided: Patient instructed to cont prior HEP. Exercises were reviewed and Iam was able to demonstrate them prior to the end of the session.  Iam demonstrated good understanding of the education provided.  See EMR under Patient Instructions for exercises provided during therapy sessions    Assessment     At Evaluation:  Iam is a 44 y.o. male referred to outpatient Physical Therapy with a medical diagnosis of tear of anterior talofibular ligament in right ankle. Patient presents with significant pain, swelling and loss of range of motion in right ankle. He is in a tall walking boot. He also has significant pain, swelling and decreased range of motion in right knee as well. He states the knee is the reason he can't put any weight on the right lower extremity despite being weight bearing as tolerated per MD orders. He says he is going to get the fluid drawn off later today. Reports h/o right knee surgery in 2012 for torn meniscus. He has an MRI confirmed tear of the anterior talofibular ligament in the right ankle. He has had no advanced imaging of the knee. He states his knee has done this before and drawing the fluid off helped so he is hopeful his knee symptoms will resolve. 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     Current Assessment:  Iam continues to have pain in his knee with any movement. He had a little more active range of motion at the knee today. He was very sensitive to touch along anterior ankle. He complained of pain with plantarflexion stretch and inversion stretch. Ended with gameready to ankle with elevation to address complaint of swelling that won't decrease. Will progress as tolerated.    Iam Is progressing towards his goals.   Pt prognosis is Good.     Pt will continue to benefit from skilled outpatient physical therapy to address the deficits listed in the problem list box on initial evaluation, provide pt/family education and to maximize pt's level of independence in the home and community environment.     Pt's spiritual, cultural and educational needs considered and pt agreeable to plan of  care and goals.     Anticipated barriers to physical therapy: none    Goals:   SHORT TERM GOALS  Patient to be independent with home exercise program to facilitate carryover between therapy visits.  Patient will have +5 degrees dorsiflexion, 15 degrees inversion and 10 degrees eversion range of motion right ankle for improved gait and functional mobility.  Patient will have 0-120 degrees range of motion right knee for improved gait and functional mobility.  Patient will increase manual muscle test of right ankle and knee to 4+ to 5/5 for increased stability with gait and activiites of daily living.     LONG TERM GOALS  Patient will go up/down stairs reciprocal pattern without handrails and up/down a ladder and with good eccentric control on right lower extremity.  Patient will ambulate independent without assistive device, without deviation and without pain in right knee or ankle.  Patient will be able to return to work full duty as a  for [employer].    Plan     Plan of care Certification: 11/7/2024 to 01/31/2025.     Outpatient Physical Therapy 3 times weekly for 4 weeks, then 2 times weekly for 6 weeks to include the following interventions: 86732 [therapeutic exercise], 27269 [neuromuscular re-education], 69627 [gait training], 61756 [manual therapy], 10567 [therapeutic activities], 88296 [unattended electrical stimulation], 01616 [biofeedback by any modality], and 15322 [vasopneumatic device].     Matilda Garza, PTA   11/18/2024

## 2024-11-18 NOTE — PATIENT INSTRUCTIONS
WOUND CARE INSTRUCTIONS    1. Leave your pressure bandage on for 24 hours (unless told to keep it on for 48 hours). You will not need to perform any wound care until this bandage is removed. Please do not get the bandage wet.  2. When you initially begin wound care, you may let the water hit the pressure bandage to loosen it from your skin. The bandage should be removed before bathing/showering.  3. Wash your hands thoroughly before starting wound care. Do not use the same cloth/rag/sponge you would use to wash the remainder of your body as this may introduce bacteria from other areas of your body and possibly cause infection at the surgical site.  4. Please clean the surgery site once to twice daily with a mild liquid soap (i.e. Dove, Cetaphil, Baby shampoo).   5. Dry the area with a fresh Q-tip or clean gauze.  6. Perform Vinegar soak to the area to help prevent infection. Soak the affected area for 5-10 minutes once daily, then pat dry. To make a quart of the vinegar soak, mix 3 tablespoons white vinegar with 1 quart of luke-warm water.   7. Apply a generous amount of Vaseline or Aquaphor to the wound/sutures (If you have been prescribed antibiotic ointment such as Mupirocin or Gentamicin, use this instead of vaseline/aquaphor). If you are not sure of the sanitary condition of any Vaseline/Aquaphor you may have at home, please purchase a new jar or tube.    8. Cut a non-stick bandage pad to fit the area and then use bandaging tape to hold in place. Paper tape is a good option for very sensitive skin types.  9. You will be doing this wound care regimen until sutures are removed   10.  After surgery, you may restart all your medications that were stopped (if applicable).      If your surgical site is on your forehead, or close to the eye area, you will want to use ice packs. Please apply ice packs every hour for 20 minutes while awake. Sleep elevated for the next two nights as this will help decrease the amount of  bruising and swelling you will notice the evening after surgery and into the next morning.   For surgical areas on your arms/legs, try to keep the area elevated above the level of your heart as much as possible. This will help to decrease swelling. Frequent gentle rubbing of your fingers or toes in that area will prevent numbness and stiffness.   If located on your arm/hand, we ask that you do not lift anything heavier than a gallon of milk for two weeks. Keep the arm/hand elevated to help decrease swelling in the wrist and fingers. Do not wear jewelry as impending swelling could cause discomfort.  For surgical areas on your head/neck, do not bend over or stoop down. Do not drop your head, as this increases blood to the surgical area and can induce bleeding. Refrain from use of hair care products, hair coloring, or permanents until sutures have been removed and/or the surgical site has completely healed.    BATHING: Begin bathing/showering once pressure bandage comes off. Do not let direct water pressure hit the surgery site. It is okay if it gets wet, just let the water roll over.    PAIN: Tylenol (Acetaminophen) or NSAIDs such as ibuprofen (Advil) or naproxen (Aleve) are adequate for pain relief in most cases, if you are able to take those medications. Alternating Tylenol (acetaminophen) and NSAIDs at 3 hour intervals works well as these medications work differently. For instance, take 1 g of Tylenol at hour 0, then 200-400 mg of Advil at hour 3 if needed, then 1 g of Tylenol at hour 6 if needed, then 200-400 mg of Advil at hour 9 if needed. Do not exceed the daily limit for either medication, which can be found on the bottle. We try to avoid narcotic medications as much as possible. If you are still having severe pain not managed by the above methods, please call our clinic.    SIGNS OF POSSIBLE INFECTION: Significant redness surrounding the surgery site that is warm to the touch, persistent or worsening pain,  fever or flu-like symptoms, increased swelling to the area, thick yellow discharge, and/or foul odor. Please call our office as soon as possible if you experience any of these symptoms as you may have an infection.     BLEEDING: A mild amount of blood on the bandage is expected. Soaking through the bandage is not normal. If this occurs, remove the soiled bandage and apply uninterrupted pressure for 20 minutes by the clock. If this does not stop the bleeding, hold pressure for another 20 minutes with an ice pack. If bleeding stops, apply a bandage per wound care instructions.     IF THE BLEEDING PERSISTS, PLEASE CALL OUR OFFICE.     Normal office hours:   After hours:     If you have concerns about how your wound is healing and would like to send us a photo, please send us a Grabbed message, or call for instructions on how to securely send an email.

## 2024-11-20 ENCOUNTER — CLINICAL SUPPORT (OUTPATIENT)
Dept: REHABILITATION | Facility: HOSPITAL | Age: 44
End: 2024-11-20
Payer: OTHER MISCELLANEOUS

## 2024-11-20 DIAGNOSIS — M25.671 STIFFNESS OF RIGHT ANKLE JOINT: ICD-10-CM

## 2024-11-20 DIAGNOSIS — M25.661 DECREASED ROM OF RIGHT KNEE: ICD-10-CM

## 2024-11-20 DIAGNOSIS — M79.604 RIGHT LEG PAIN: ICD-10-CM

## 2024-11-20 DIAGNOSIS — S93.491D SPRAIN OF ANTERIOR TALOFIBULAR LIGAMENT OF RIGHT ANKLE, SUBSEQUENT ENCOUNTER: Primary | ICD-10-CM

## 2024-11-20 DIAGNOSIS — R26.2 DIFFICULTY WALKING: ICD-10-CM

## 2024-11-20 PROCEDURE — 97016 VASOPNEUMATIC DEVICE THERAPY: CPT

## 2024-11-20 PROCEDURE — 97110 THERAPEUTIC EXERCISES: CPT

## 2024-11-20 PROCEDURE — 97140 MANUAL THERAPY 1/> REGIONS: CPT

## 2024-11-20 PROCEDURE — 97112 NEUROMUSCULAR REEDUCATION: CPT

## 2024-11-20 NOTE — PROGRESS NOTES
[facility]   Physical Therapy Treatment Note      Name: Iam Morillo  Clinic Number: [id number]    Therapy Diagnosis:   Encounter Diagnoses   Name Primary?    Sprain of anterior talofibular ligament of right ankle, subsequent encounter Yes    Right leg pain     Difficulty walking     Decreased ROM of right knee     Stiffness of right ankle joint      Physician: Yonatan Cross MD    Visit Date: 11/20/2024    Physician Orders: PT Eval and Treat   Medical Diagnosis from Referral: see above  Evaluation Date: 11/7/2024  Authorization Period Expiration: 10/29/2026  Plan of Care Expiration: 01/31/2025     Date of Surgery: n/a  Visit # / Visits authorized: 5/24   FOTO: 1/ 3 = 38     Precautions: Standard     PTA Visit #: 0/5     Time In: 10:05 am  Time Out: 11:08 am  Total Billable Time: 60 minutes (including Game Ready)    Subjective     Pt reports: knee is not as bad as it was but it still hurts.   He was compliant with home exercise program.  Response to previous treatment: no complaints   Functional change: no change noted    Pain: 3/10 at rest, 7-8/10 with movement    Location: right knee and ankle    Objective      17-75 degrees right knee range of motion       Treatment     Iam received the treatments listed below:      therapeutic exercises to develop strength, endurance, ROM, flexibility, posture, and core stabilization for 26 minutes including:  NuStep -  Hamstring rolls x 20  Ankle dorsiflexion/plantarflexion - yellow x 30 each  Ankle eversion/inversion - yellow x 10 inversion, x 20 eversion   Seated dorsiflexion x 30  Seated plantarflexion x 30  Seated calf stretch - soleus 5 x 20sh    manual therapy techniques: Joint mobilizations, Manual traction, Myofacial release, Soft tissue Mobilization, and Friction Massage were applied to the: ankle for 9 minutes, including:  Gentle stretching in all planes and light desensitization over anterior ankle    neuromuscular  re-education activities to improve: Balance, Coordination, Kinesthetic Sense, Proprioception, and Posture for 10 minutes. The following activities were included:  Seated wobble board plantarflexion/dorsiflexion x 30  MRE - 4 way ankle - 3sh x 20 each  Short foot x 10      therapeutic activities to improve functional performance for 0  minutes, including:  (not performed today)     gait training to improve functional mobility and safety for 0  minutes, including:  - will not put any weight on right lower extremity     direct contact modalities after being cleared for contraindications:     supervised modalities after being cleared for contradictions:   IFC Electrical Stimulation:  Iam received IFC Electrical Stimulation for pain control applied to the right knee. Pt received stimulation at 50 % scan at a frequency of  for 0 minutes. Iam tolderated treatment well without any adverse effects.      NMES Electrical Stimulation:  Iam received NMES Electrical Stimulation to elicit muscle contraction of the right quad. Pt received stimulation at a rate of 50 pps with symmetric current, ramp of 2 seconds with 10 second on time and 20 second off time. Patient tolerated treatment well without any adverse effects. (Not today)    Vasopneumatic/Game Ready to right ankle for 15 minutes - medium compression - end of bed elevated - to decrease pain and swelling    biofeedback to isolate quad contraction, decrease cocontraction, and assist with neuromuscular reeducation for 0 minutes. Exercises performed with biofeedback Including: (not performed today)       Patient Education and Home Exercises       Education provided:   - review of home exercise program and current Plan of Care/rationale of treatment.    Written Home Exercises Provided: Patient instructed to cont prior HEP. Exercises were reviewed and Iam was able to demonstrate them prior to the end of the session.  Iam demonstrated good understanding of  the education provided. See EMR under Patient Instructions for exercises provided during therapy sessions    Assessment     At Evaluation:  Iam is a 44 y.o. male referred to outpatient Physical Therapy with a medical diagnosis of tear of anterior talofibular ligament in right ankle. Patient presents with significant pain, swelling and loss of range of motion in right ankle. He is in a tall walking boot. He also has significant pain, swelling and decreased range of motion in right knee as well. He states the knee is the reason he can't put any weight on the right lower extremity despite being weight bearing as tolerated per MD orders. He says he is going to get the fluid drawn off later today. Reports h/o right knee surgery in 2012 for torn meniscus. He has an MRI confirmed tear of the anterior talofibular ligament in the right ankle. He has had no advanced imaging of the knee. He states his knee has done this before and drawing the fluid off helped so he is hopeful his knee symptoms will resolve. Iam is a  for [employer] so he has a labor intensive job to return to which will require extensive therapy.     Current Assessment:  Iam continues to have pain in his knee at rest and with any movement. He did have a little more active range of motion at the knee but no decrease in pain. He continues to have notable swelling of the knee as well. He has been very limited in therapy with his ankle due to the knee pain. He was very sensitive to touch along anterior ankle. He complained of pain with active ankle inversion and began ankle/foot began shaking uncontrollably so this was stopped. He is still unable to fully extend the right knee in supine or long sit. Ended with gameready to ankle with elevation to address complaint of swelling that has not improved. Will progress as tolerated.    Iam Is progressing towards his goals.   Pt prognosis is Good.     Pt will continue to benefit from  skilled outpatient physical therapy to address the deficits listed in the problem list box on initial evaluation, provide pt/family education and to maximize pt's level of independence in the home and community environment.     Pt's spiritual, cultural and educational needs considered and pt agreeable to plan of care and goals.     Anticipated barriers to physical therapy: none    Goals:   SHORT TERM GOALS  Patient to be independent with home exercise program to facilitate carryover between therapy visits.  Patient will have +5 degrees dorsiflexion, 15 degrees inversion and 10 degrees eversion range of motion right ankle for improved gait and functional mobility.  Patient will have 0-120 degrees range of motion right knee for improved gait and functional mobility.  Patient will increase manual muscle test of right ankle and knee to 4+ to 5/5 for increased stability with gait and activiites of daily living.     LONG TERM GOALS  Patient will go up/down stairs reciprocal pattern without handrails and up/down a ladder and with good eccentric control on right lower extremity.  Patient will ambulate independent without assistive device, without deviation and without pain in right knee or ankle.  Patient will be able to return to work full duty as a  for [employer].    Plan     Plan of care Certification: 11/7/2024 to 01/31/2025.     Outpatient Physical Therapy 3 times weekly for 4 weeks, then 2 times weekly for 6 weeks to include the following interventions: 69444 [therapeutic exercise], 01643 [neuromuscular re-education], 22602 [gait training], 64031 [manual therapy], 82174 [therapeutic activities], 87560 [unattended electrical stimulation], 16074 [biofeedback by any modality], and 30251 [vasopneumatic device].     TATE LUTHER, PT   11/20/2024

## 2024-11-21 ENCOUNTER — PROCEDURE VISIT (OUTPATIENT)
Dept: DERMATOLOGY | Facility: CLINIC | Age: 44
End: 2024-11-21
Payer: COMMERCIAL

## 2024-11-21 VITALS — SYSTOLIC BLOOD PRESSURE: 129 MMHG | DIASTOLIC BLOOD PRESSURE: 79 MMHG

## 2024-11-21 DIAGNOSIS — D48.9 NEOPLASM OF UNCERTAIN BEHAVIOR: Primary | ICD-10-CM

## 2024-11-21 PROCEDURE — 88304 TISSUE EXAM BY PATHOLOGIST: CPT | Mod: 26,,, | Performed by: PATHOLOGY

## 2024-11-21 PROCEDURE — 88304 TISSUE EXAM BY PATHOLOGIST: CPT | Mod: TC,SUR | Performed by: STUDENT IN AN ORGANIZED HEALTH CARE EDUCATION/TRAINING PROGRAM

## 2024-11-21 NOTE — PROGRESS NOTES
Excision Consult Note    Iam Morillo is a 44 y.o. male who is referred by Dr. Chan for evaluation of a NUB on the R nasal bridge and R posterior scalp. These have grown and are pruritic.     Recurrent skin cancer: No    Preoperative Risk Factors:  Current Anticoagulants: No  Endocarditis / Rheumatic Fever hx: No  Immunocompromised: No  Prosthetic joint: No  Congenital heart defect: No  Prosthetic heart valve: No  Diabetic: No  Transplant: No  Pacemaker: No  Defibrillator:  No  Prior problem with local anesthesia: No  Tobacco History: Yes]  Clindamycin Allergy: No  Pregnant: no     Transmissible Diseases:  HIV No  Hepatitis B or C  No      Exam:  Limited skin exam is normal except for a NUB  located on the R nasal bridge and R posterior scalp.    Pathologic Findings:  Accession # n/a  Diagnosis: NUB    Assessment and Plan:  Treatment Options : Given the indications and high cure rate, the patient has agreed to proceed with excision  Risks and Benefits : The rationale for excision was explained to the patient. The risks and benefits to therapy were discussed in detail. Specifically, the risks of infection, scarring, bleeding, dehiscence, hematoma, prolonged wound healing, incomplete removal, allergy to anesthesia, nerve injury, inability to clear the lesion and recurrence were addressed. The treatment site was clearly identified and confirmed by the patient.    Plan:  Excision    Atif Gutierrez MD   Mohs Surgery/Dermatologic Oncology

## 2024-11-21 NOTE — PROGRESS NOTES
[facility]    Atif Gutierrez MD    Elliptical Excision with Linear Closure    Tumor Type: NUB  Location:  A) R nasal bridge, B) R posterior scalp  Derm-Path Accession #:  n/a  Lesion Size:  A) 1.3 x 1.1, B) 1.2 x 1.3 cm  Surgical Margins: 0  Post op size: A) 1.3 x 1.1, B) 1.2 x 1.3 cm  Level of Defect:  fat  Repair Type:  Linear   Repair Length:  A) 1.5 cm, B) 2 cm  Sutures: A) 5-0 monocryl, 6-0 prolene, B) 4-0 monocryl, 5-0 prolene  Amount of lidocaine used: A) 3 cc of 2% lidocaine with epi, B) 6 cc 2% lidocaine with epi  Primary Surgeon: WILLIAM Gutierrez MD  Indication for complex repair for A: Wide undermining (1.5 cm)     INDICATIONS:  The risks of bleeding, infection, discomfort, incomplete removal, and scar formation were explained to the patient.  All questions were answered.  After informed consent, confirmation of site and identity, and appropriate instructions, the patient underwent the procedure as follows:    PROCEDURE:  With the patient in a supine position, the lesion was outlined with the above margins. An ellipse was designed around the lesion to conform to relaxed skin tension lines in an effort to minimize scarring and deformity.  The patient was then placed in a supine position.  The lesion and surrounding skin were prepped with chlorhexidine, draped, and anesthetized with 1% lidocaine with epinephrine 1:100,100 buffered with 1:10 sodium bicarbonate.  Using a #15 blade, the skin was excised along premarked lines.  The resulting defect extended through deep subcutaneous tissue.  Wound margins were undermined to limit functional deformity/impairment of adjacent structures.  Bleeding vessels were controlled with  monopolar  electrodessication .  A deep placating retention suture was placed to offload tension to the deep margin. The dermis and subcutaneous tissue were closed with buried vertical mattress sutures.  Epidermal approximation was meticulously refined with simple running sutures,  resulting in a linear closure with little to no wound tension.  Blood loss was estimated to be less than 5cc.  The area was coated with petrolatum and covered with a non-adherent dressing followed by gauze and tape.  Postoperative instructions were reviewed per protocol.  The patient left alert and fully oriented.        tAif Gutierrez MD

## 2024-11-25 ENCOUNTER — OFFICE VISIT (OUTPATIENT)
Dept: ORTHOPEDICS | Facility: CLINIC | Age: 44
End: 2024-11-25
Payer: COMMERCIAL

## 2024-11-25 VITALS
SYSTOLIC BLOOD PRESSURE: 122 MMHG | HEIGHT: 72 IN | HEART RATE: 106 BPM | OXYGEN SATURATION: 77 % | WEIGHT: 240 LBS | BODY MASS INDEX: 32.51 KG/M2 | DIASTOLIC BLOOD PRESSURE: 78 MMHG

## 2024-11-25 DIAGNOSIS — S83.511A SPRAIN OF ANTERIOR CRUCIATE LIGAMENT OF RIGHT KNEE, INITIAL ENCOUNTER: Primary | ICD-10-CM

## 2024-11-25 DIAGNOSIS — S93.491A SPRAIN OF ANTERIOR TALOFIBULAR LIGAMENT OF RIGHT ANKLE, INITIAL ENCOUNTER: ICD-10-CM

## 2024-11-25 PROCEDURE — 99213 OFFICE O/P EST LOW 20 MIN: CPT | Mod: PBBFAC | Performed by: ORTHOPAEDIC SURGERY

## 2024-11-25 PROCEDURE — 99999 PR PBB SHADOW E&M-EST. PATIENT-LVL III: CPT | Mod: PBBFAC,,, | Performed by: ORTHOPAEDIC SURGERY

## 2024-11-25 PROCEDURE — 99999PBSHW PR PBB SHADOW TECHNICAL ONLY FILED TO HB: Mod: PBBFAC,,,

## 2024-11-25 PROCEDURE — 20610 DRAIN/INJ JOINT/BURSA W/O US: CPT | Mod: PBBFAC | Performed by: ORTHOPAEDIC SURGERY

## 2024-11-25 RX ORDER — TRIAMCINOLONE ACETONIDE 40 MG/ML
40 INJECTION, SUSPENSION INTRA-ARTICULAR; INTRAMUSCULAR
Status: DISCONTINUED | OUTPATIENT
Start: 2024-11-25 | End: 2024-11-25 | Stop reason: HOSPADM

## 2024-11-25 RX ORDER — BUPIVACAINE HYDROCHLORIDE 2.5 MG/ML
1 INJECTION, SOLUTION EPIDURAL; INFILTRATION; INTRACAUDAL
Status: DISCONTINUED | OUTPATIENT
Start: 2024-11-25 | End: 2024-11-25 | Stop reason: HOSPADM

## 2024-11-25 RX ORDER — HYDROCODONE BITARTRATE AND ACETAMINOPHEN 7.5; 325 MG/1; MG/1
1 TABLET ORAL EVERY 6 HOURS PRN
Qty: 28 TABLET | Refills: 0 | Status: SHIPPED | OUTPATIENT
Start: 2024-11-25

## 2024-11-25 RX ADMIN — TRIAMCINOLONE ACETONIDE 40 MG: 40 INJECTION, SUSPENSION INTRA-ARTICULAR; INTRAMUSCULAR at 03:11

## 2024-11-25 RX ADMIN — BUPIVACAINE HYDROCHLORIDE 1 ML: 2.5 INJECTION, SOLUTION EPIDURAL; INFILTRATION; INTRACAUDAL at 03:11

## 2024-11-25 NOTE — LETTER
November 25, 2024      [facility]  [address] [place] [id number]  Phone: [phone]  Fax: [phone]       Patient: Iam Morillo   YOB: 1980  Date of Visit: 11/25/2024    To Whom It May Concern:    Kit Morillo  was at [facility] on 11/25/2024. The patient will remain off of work until after his return appointment in one month. If you have any questions or concerns, or if I can be of further assistance, please do not hesitate to contact me.    Sincerely,  MD Salima Pop MA

## 2024-11-25 NOTE — PROGRESS NOTES
Patient is here for follow-up of the right ankle had the ATFL sprain we will send him to therapy to work on motion in his ankle having pain in his knee.  The MRI shows he was contusion of the tibia and femur.  He has a grade 1 strain of his ACL not a complete tear.  He has a slight effusion of the knee.  He has some chondromalacia changes.  We discussed treatment options.  I injected his right knee with 1 cc of Marcaine 1 cc Kenalog.  She will get him some relief the symptoms so he can start with the therapy on in his knee on in his ankle.  We will work on strengthening.  I will follow back up in 3-4 weeks.  At this time he does not having meniscus tear does not have a complete ACL tear we are going to work on strengthening of the knee and ankle he was having difficulty weight-bearing on his right knee.  He has the partial ACL tear.  I will order an ACL brace let therapy work on strengthening of the knee in the ankle.  Currently he is off work.  I will recheck him in a month.

## 2024-11-25 NOTE — PROCEDURES
Large Joint Aspiration/Injection: R knee    Date/Time: 11/25/2024 3:20 PM    Performed by: Yonatan Cross MD  Authorized by: Yonatan Cross MD    Consent Done?:  Yes (Verbal)  Indications:  Arthritis    Details:  Needle Size:  22 G  Ultrasonic Guidance for needle placement?: No    Approach:  Anterolateral  Location:  Knee  Site:  R knee  Medications:  1 mL BUPivacaine (PF) 0.25% (2.5 mg/ml) 0.25 % (2.5 mg/mL); 40 mg triamcinolone acetonide 40 mg/mL  Patient tolerance:  Patient tolerated the procedure well with no immediate complications

## 2024-11-26 ENCOUNTER — TELEPHONE (OUTPATIENT)
Dept: ORTHOPEDICS | Facility: CLINIC | Age: 44
End: 2024-11-26
Payer: COMMERCIAL

## 2024-11-26 DIAGNOSIS — S83.511A SPRAIN OF ANTERIOR CRUCIATE LIGAMENT OF RIGHT KNEE, INITIAL ENCOUNTER: Primary | ICD-10-CM

## 2024-11-26 NOTE — TELEPHONE ENCOUNTER
----- Message from Gemma sent at 11/26/2024  1:50 PM CST -----  Regarding: Knee Brace Fitting  Who Called: Iam Morillo    Patient wants to know if the person that fits for knee braces is in today. He said he was told to call before coming up here to check.    Preferred Method of Contact: Phone Call  Patient's Preferred Phone Number on File: [phone]

## 2024-11-26 NOTE — TELEPHONE ENCOUNTER
Called patient and notified him that the rep for the knee brace wasn't coming in to the office today. He stated he would go to [facility] instead. I told him I would place an order for the ACL brace.

## 2024-11-27 ENCOUNTER — CLINICAL SUPPORT (OUTPATIENT)
Dept: DERMATOLOGY | Facility: CLINIC | Age: 44
End: 2024-11-27
Payer: COMMERCIAL

## 2024-11-27 ENCOUNTER — CLINICAL SUPPORT (OUTPATIENT)
Dept: REHABILITATION | Facility: HOSPITAL | Age: 44
End: 2024-11-27
Payer: OTHER MISCELLANEOUS

## 2024-11-27 DIAGNOSIS — R26.2 DIFFICULTY WALKING: ICD-10-CM

## 2024-11-27 DIAGNOSIS — S93.491D SPRAIN OF ANTERIOR TALOFIBULAR LIGAMENT OF RIGHT ANKLE, SUBSEQUENT ENCOUNTER: Primary | ICD-10-CM

## 2024-11-27 DIAGNOSIS — Z48.02 ENCOUNTER FOR REMOVAL OF SUTURES: Primary | ICD-10-CM

## 2024-11-27 DIAGNOSIS — M25.661 DECREASED ROM OF RIGHT KNEE: ICD-10-CM

## 2024-11-27 DIAGNOSIS — M79.604 RIGHT LEG PAIN: ICD-10-CM

## 2024-11-27 DIAGNOSIS — M25.671 STIFFNESS OF RIGHT ANKLE JOINT: ICD-10-CM

## 2024-11-27 PROCEDURE — 97014 ELECTRIC STIMULATION THERAPY: CPT

## 2024-11-27 PROCEDURE — 97110 THERAPEUTIC EXERCISES: CPT

## 2024-11-27 PROCEDURE — 97112 NEUROMUSCULAR REEDUCATION: CPT

## 2024-11-27 PROCEDURE — 97140 MANUAL THERAPY 1/> REGIONS: CPT

## 2024-11-27 NOTE — PROGRESS NOTES
Tumor Type: NUB  Location:  A) R nasal bridge, B) R posterior scalp  Derm-Path Accession #:  n/a  Lesion Size:  A) 1.3 x 1.1, B) 1.2 x 1.3 cm  Surgical Margins: 0  Post op size: A) 1.3 x 1.1, B) 1.2 x 1.3 cm  Level of Defect:  fat  Repair Type:  Linear   Repair Length:  A) 1.5 cm, B) 2 cm  Sutures: A) 5-0 monocryl, 6-0 prolene, B) 4-0 monocryl, 5-0 prolene  Amount of lidocaine used: A) 3 cc of 2% lidocaine with epi, B) 6 cc 2% lidocaine with epi  Primary Surgeon: WILLIAM Gutierrez MD  Indication for complex repair for A: Wide undermining (1.5 cm)           Sutures removed from both sites without incident   No s/s of infection   Tanja Cannon,CMA

## 2024-11-27 NOTE — PROGRESS NOTES
[facility]   Physical Therapy Treatment Note      Name: Iam Morillo  Clinic Number: [id number]    Therapy Diagnosis: Sprain of anterior talofibular ligament of right ankle and ACL sprain    Physician: Yonatan Cross MD    Visit Date: 11/27/2024    Physician Orders: PT Eval and Treat   Medical Diagnosis from Referral: see above  Evaluation Date: 11/7/2024  Authorization Period Expiration: 10/29/2026  Plan of Care Expiration: 01/31/2025     Date of Surgery: n/a  Visit # / Visits authorized: 5/24   FOTO: 1/ 3 = 38     Precautions: Standard     PTA Visit #: 0/5     Time In: 5:25 pm  Time Out: 6:10 pm  Total Billable Time: 45 minutes     Subjective     Pt reports: that his knee is more of a concern than his ankle  He was compliant with home exercise program.  Response to previous treatment: no complaints   Functional change: no change noted    Pain: 3/10 at rest, 7-8/10 with movement    Location: right knee and ankle    Objective      -7-83 degrees right knee range of motion       Treatment     Iam received the treatments listed below:      therapeutic exercises to develop strength, endurance, ROM, flexibility, posture, and core stabilization for 12 minutes including:  NuStep - 5 minutes  Hamstring rolls x 20  Ankle dorsiflexion/plantarflexion - yellow x 30 each  Ankle eversion/inversion - yellow x 10 inversion, x 20 eversion   Heel prop with CP    manual therapy techniques: Joint mobilizations, Manual traction, Myofacial release, Soft tissue Mobilization, and Friction Massage were applied to the: ankle for 8 minutes, including:  Gentle stretching in all planes and light desensitization over anterior ankle    neuromuscular re-education activities to improve: Balance, Coordination, Kinesthetic Sense, Proprioception, and Posture for 10 minutes. The following activities were included:  Seated wobble board plantarflexion/dorsiflexion x 30  MRE - 4 way ankle - 3sh x 20 each  Short  foot x 10      therapeutic activities to improve functional performance for 0  minutes, including:  (not performed today)     gait training to improve functional mobility and safety for 0  minutes, including:  - will not put any weight on right lower extremity     direct contact modalities after being cleared for contraindications:     supervised modalities after being cleared for contradictions:   IFC Electrical Stimulation:  Iam received IFC Electrical Stimulation for pain control applied to the right knee. Pt received stimulation at 50 % scan at a frequency of  for 15 minutes. Iam tolderated treatment well without any adverse effects.  Right knee and ankle with CPs    NMES Electrical Stimulation:  Iam received NMES Electrical Stimulation to elicit muscle contraction of the right quad. Pt received stimulation at a rate of 50 pps with symmetric current, ramp of 2 seconds with 10 second on time and 20 second off time. Patient tolerated treatment well without any adverse effects. (Not today)    Vasopneumatic/Game Ready to right ankle for  minutes - medium compression - end of bed elevated - to decrease pain and swelling    biofeedback to isolate quad contraction, decrease cocontraction, and assist with neuromuscular reeducation for 0 minutes. Exercises performed with biofeedback Including: (not performed today)       Patient Education and Home Exercises       Education provided:   - review of home exercise program and current Plan of Care/rationale of treatment.    Written Home Exercises Provided: Patient instructed to cont prior HEP. Exercises were reviewed and Iam was able to demonstrate them prior to the end of the session.  Iam demonstrated good understanding of the education provided. See EMR under Patient Instructions for exercises provided during therapy sessions    Assessment     At Evaluation:  Iam is a 44 y.o. male referred to outpatient Physical Therapy with a medical  diagnosis of tear of anterior talofibular ligament in right ankle. Patient presents with significant pain, swelling and loss of range of motion in right ankle. He is in a tall walking boot. He also has significant pain, swelling and decreased range of motion in right knee as well. He states the knee is the reason he can't put any weight on the right lower extremity despite being weight bearing as tolerated per MD orders. He says he is going to get the fluid drawn off later today. Reports h/o right knee surgery in 2012 for torn meniscus. He has an MRI confirmed tear of the anterior talofibular ligament in the right ankle. He has had no advanced imaging of the knee. He states his knee has done this before and drawing the fluid off helped so he is hopeful his knee symptoms will resolve. Iam is a  for [employer] so he has a labor intensive job to return to which will require extensive therapy.     Current Assessment:  Patient reports that his knee is more of a problem for him than ankle. Patient has grade 3 tear of right ATFL wearing CAM boot using bilateral crutches. MRI says that he has grade 1 sprain of right ACL but patient patient reports significant swelling within 48 hours of injury in September and still presents with edema and quad inhibition. Unable to perform Lachman's test due to pain and guarding. Patient lacks extension and flexion of right knee. Feel that injury to ACL may be more extensive than what MRI report says. Will continue to address overall reduction in edema, pain and inflammation of right ankle and knee while working to improve range of motion, quad function and ankle instability.     Iam Is progressing towards his goals.   Pt prognosis is Good.     Pt will continue to benefit from skilled outpatient physical therapy to address the deficits listed in the problem list box on initial evaluation, provide pt/family education and to maximize pt's level of independence in  the home and community environment.     Pt's spiritual, cultural and educational needs considered and pt agreeable to plan of care and goals.     Anticipated barriers to physical therapy: none    Goals:   SHORT TERM GOALS  Patient to be independent with home exercise program to facilitate carryover between therapy visits.  Patient will have +5 degrees dorsiflexion, 15 degrees inversion and 10 degrees eversion range of motion right ankle for improved gait and functional mobility.  Patient will have 0-120 degrees range of motion right knee for improved gait and functional mobility.  Patient will increase manual muscle test of right ankle and knee to 4+ to 5/5 for increased stability with gait and activiites of daily living.     LONG TERM GOALS  Patient will go up/down stairs reciprocal pattern without handrails and up/down a ladder and with good eccentric control on right lower extremity.  Patient will ambulate independent without assistive device, without deviation and without pain in right knee or ankle.  Patient will be able to return to work full duty as a  for [employer].    Plan     Plan of care Certification: 11/7/2024 to 01/31/2025.     Outpatient Physical Therapy 3 times weekly for 4 weeks, then 2 times weekly for 6 weeks to include the following interventions: 63912 [therapeutic exercise], 60294 [neuromuscular re-education], 40681 [gait training], 16811 [manual therapy], 15712 [therapeutic activities], 04200 [unattended electrical stimulation], 41494 [biofeedback by any modality], and 54491 [vasopneumatic device].     DAREN RAYMUNDO, PT   11/27/2024

## 2024-12-02 ENCOUNTER — CLINICAL SUPPORT (OUTPATIENT)
Dept: REHABILITATION | Facility: HOSPITAL | Age: 44
End: 2024-12-02
Payer: OTHER MISCELLANEOUS

## 2024-12-02 DIAGNOSIS — M25.661 DECREASED ROM OF RIGHT KNEE: ICD-10-CM

## 2024-12-02 DIAGNOSIS — M79.604 RIGHT LEG PAIN: ICD-10-CM

## 2024-12-02 DIAGNOSIS — S93.491D SPRAIN OF ANTERIOR TALOFIBULAR LIGAMENT OF RIGHT ANKLE, SUBSEQUENT ENCOUNTER: Primary | ICD-10-CM

## 2024-12-02 DIAGNOSIS — M25.671 STIFFNESS OF RIGHT ANKLE JOINT: ICD-10-CM

## 2024-12-02 DIAGNOSIS — R26.2 DIFFICULTY WALKING: ICD-10-CM

## 2024-12-02 PROCEDURE — 97110 THERAPEUTIC EXERCISES: CPT | Mod: CQ

## 2024-12-02 PROCEDURE — 97112 NEUROMUSCULAR REEDUCATION: CPT | Mod: CQ

## 2024-12-02 NOTE — PROGRESS NOTES
[facility]   Physical Therapy Treatment Note      Name: Iam Morillo  Clinic Number: [id number]    Therapy Diagnosis: Sprain of anterior talofibular ligament of right ankle and ACL sprain    Physician: Yonatan Cross MD    Visit Date: 12/2/2024    Physician Orders: PT Eval and Treat   Medical Diagnosis from Referral: see above  Evaluation Date: 11/7/2024  Authorization Period Expiration: 10/29/2026  Plan of Care Expiration: 01/31/2025     Date of Surgery: n/a  Visit # / Visits authorized: 7/24   FOTO: 1/ 3 = 38     Precautions: Standard     PTA Visit #: 1/5     Time In: 9:59 am  Time Out: 10:55 am  Total Billable Time: 45 minutes     Subjective     Pt reports: his knee continues to be a problem. He is concerned because his foot stays a different color now. He describes it as purple and says he had an ultrasound that showed no blockages in his veins.   He was compliant with home exercise program.  Response to previous treatment: no complaints   Functional change: no change noted    Pain: 3/10 at rest, 7-8/10 with movement    Location: right knee and ankle    Objective      -10-88 degrees right knee range of motion     Treatment     Iam received the treatments listed below:      therapeutic exercises to develop strength, endurance, ROM, flexibility, posture, and core stabilization for 30 billable minutes including:  NuStep - 5 minutes  Hamstring rolls x 30  Ankle dorsiflexion/plantarflexion - yellow x 30 each  Ankle eversion/inversion - yellow x 10 inversion, x 20 eversion   Seated dorsiflexion x 30  Seated plantarflexion x 30  Seated calf stretch - soleus 5 x 20sh  Heel prop with CP    manual therapy techniques: Joint mobilizations, Manual traction, Myofacial release, Soft tissue Mobilization, and Friction Massage were applied to the: ankle for 0 minutes, including:  Gentle stretching in all planes and light desensitization over anterior ankle    neuromuscular re-education  activities to improve: Balance, Coordination, Kinesthetic Sense, Proprioception, and Posture for 15 billable minutes. The following activities were included:  Quad sets x 20  Straight leg raise with strap x 10  Seated wobble board plantarflexion/dorsiflexion x 30  MRE - 4 way ankle - 3sh   Short foot x 20      therapeutic activities to improve functional performance for 0  minutes, including:  (not performed today)     gait training to improve functional mobility and safety for 0  minutes, including:  - will not put any weight on right lower extremity     direct contact modalities after being cleared for contraindications:     supervised modalities after being cleared for contradictions:   IFC Electrical Stimulation:  Iam received IFC Electrical Stimulation for pain control applied to the right knee. Pt received stimulation at 50 % scan at a frequency of  for 0 minutes. Iam tolderated treatment well without any adverse effects.  Right knee and ankle with CPs    NMES Electrical Stimulation:  Iam received NMES Electrical Stimulation to elicit muscle contraction of the right quad. Pt received stimulation at a rate of 50 pps with symmetric current, ramp of 2 seconds with 10 second on time and 20 second off time. Patient tolerated treatment well without any adverse effects. (Not today)    Vasopneumatic/Game Ready to right ankle for  minutes - medium compression - end of bed elevated - to decrease pain and swelling    biofeedback to isolate quad contraction, decrease cocontraction, and assist with neuromuscular reeducation for 0 minutes. Exercises performed with biofeedback Including: (not performed today)       Patient Education and Home Exercises       Education provided:   - review of home exercise program and current Plan of Care/rationale of treatment.    Written Home Exercises Provided: Patient instructed to cont prior HEP. Exercises were reviewed and Iam was able to demonstrate them prior to  the end of the session.  Iam demonstrated good understanding of the education provided. See EMR under Patient Instructions for exercises provided during therapy sessions    Assessment     At Evaluation:  Iam is a 44 y.o. male referred to outpatient Physical Therapy with a medical diagnosis of tear of anterior talofibular ligament in right ankle. Patient presents with significant pain, swelling and loss of range of motion in right ankle. He is in a tall walking boot. He also has significant pain, swelling and decreased range of motion in right knee as well. He states the knee is the reason he can't put any weight on the right lower extremity despite being weight bearing as tolerated per MD orders. He says he is going to get the fluid drawn off later today. Reports h/o right knee surgery in 2012 for torn meniscus. He has an MRI confirmed tear of the anterior talofibular ligament in the right ankle. He has had no advanced imaging of the knee. He states his knee has done this before and drawing the fluid off helped so he is hopeful his knee symptoms will resolve. Iam is a  for [employer] so he has a labor intensive job to return to which will require extensive therapy.     Current Assessment:  Iam continues to ambulate nonweightbearing with bilateral axillary crutches. Added quad sets and straight leg raise with strap today. Plan to add neuromuscular electrical stimulation to quads next visit. Encouraged patient to let foot rest on the floor while standing and transfer as much weight as possible to it. Will continue to address overall reduction in edema, pain and inflammation of right ankle and knee while working to improve range of motion, quad function and ankle instability.     Iam Is progressing towards his goals.   Pt prognosis is Good.     Pt will continue to benefit from skilled outpatient physical therapy to address the deficits listed in the problem list box on initial  evaluation, provide pt/family education and to maximize pt's level of independence in the home and community environment.     Pt's spiritual, cultural and educational needs considered and pt agreeable to plan of care and goals.     Anticipated barriers to physical therapy: none    Goals:   SHORT TERM GOALS  Patient to be independent with home exercise program to facilitate carryover between therapy visits.  Patient will have +5 degrees dorsiflexion, 15 degrees inversion and 10 degrees eversion range of motion right ankle for improved gait and functional mobility.  Patient will have 0-120 degrees range of motion right knee for improved gait and functional mobility.  Patient will increase manual muscle test of right ankle and knee to 4+ to 5/5 for increased stability with gait and activiites of daily living.     LONG TERM GOALS  Patient will go up/down stairs reciprocal pattern without handrails and up/down a ladder and with good eccentric control on right lower extremity.  Patient will ambulate independent without assistive device, without deviation and without pain in right knee or ankle.  Patient will be able to return to work full duty as a  for [employer].    Plan     Plan of care Certification: 11/7/2024 to 01/31/2025.     Outpatient Physical Therapy 3 times weekly for 4 weeks, then 2 times weekly for 6 weeks to include the following interventions: 12058 [therapeutic exercise], 29347 [neuromuscular re-education], 77513 [gait training], 81813 [manual therapy], 89952 [therapeutic activities], 23270 [unattended electrical stimulation], 63916 [biofeedback by any modality], and 09734 [vasopneumatic device].     Matilda Garza, PTA   12/02/2024

## 2024-12-04 ENCOUNTER — CLINICAL SUPPORT (OUTPATIENT)
Dept: REHABILITATION | Facility: HOSPITAL | Age: 44
End: 2024-12-04
Payer: OTHER MISCELLANEOUS

## 2024-12-04 DIAGNOSIS — R26.2 DIFFICULTY WALKING: ICD-10-CM

## 2024-12-04 DIAGNOSIS — M25.671 STIFFNESS OF RIGHT ANKLE JOINT: ICD-10-CM

## 2024-12-04 DIAGNOSIS — S93.491D SPRAIN OF ANTERIOR TALOFIBULAR LIGAMENT OF RIGHT ANKLE, SUBSEQUENT ENCOUNTER: Primary | ICD-10-CM

## 2024-12-04 DIAGNOSIS — M79.604 RIGHT LEG PAIN: ICD-10-CM

## 2024-12-04 DIAGNOSIS — M25.661 DECREASED ROM OF RIGHT KNEE: ICD-10-CM

## 2024-12-04 PROCEDURE — 97112 NEUROMUSCULAR REEDUCATION: CPT

## 2024-12-04 PROCEDURE — 97014 ELECTRIC STIMULATION THERAPY: CPT

## 2024-12-04 PROCEDURE — 97530 THERAPEUTIC ACTIVITIES: CPT

## 2024-12-04 PROCEDURE — 97110 THERAPEUTIC EXERCISES: CPT

## 2024-12-04 NOTE — PROGRESS NOTES
[facility]   Physical Therapy Treatment Note      Name: Iam Morillo  Clinic Number: [id number]    Therapy Diagnosis: Sprain of anterior talofibular ligament of right ankle and ACL sprain    Physician: Yonatan Cross MD    Visit Date: 12/4/2024    Physician Orders: PT Eval and Treat   Medical Diagnosis from Referral: see above  Evaluation Date: 11/7/2024  Authorization Period Expiration: 10/29/2026  Plan of Care Expiration: 01/31/2025     Date of Surgery: n/a  Visit # / Visits authorized: 8/24   FOTO: 1/ 3 = 38     Precautions: Standard     PTA Visit #: 0/5     Time In: 10:03 am  Time Out: 10:55 am  Total Billable Time: 44 billable minutes     Subjective     Pt reports: knee continues to swell and be very painful - injection has not decreased the fluid  He was compliant with home exercise program.  Response to previous treatment: no complaints   Functional change: no change noted    Pain: 3/10 at rest, 7-8/10 with movement    Location: right knee and ankle    Objective       degrees right knee range of motion (improved from 88 degrees flexion last visit)    Treatment     Iam received the treatments listed below:      therapeutic exercises to develop strength, endurance, ROM, flexibility, posture, and core stabilization for 16 billable minutes including:  Calf stretch with towel - 4 x 20sh  Ankle dorsiflexion/plantarflexion - yellow x 30 each  Ankle eversion/inversion - yellow 2 x 8 inversion, x 20 eversion   Seated calf stretch - soleus 5 x 20sh    (Not today)  NuStep -  Hamstring rolls x 30  Seated dorsiflexion x 30  Seated plantarflexion x 30  Heel prop with CP    manual therapy techniques: Joint mobilizations, Manual traction, Myofacial release, Soft tissue Mobilization, and Friction Massage were applied to the: ankle for 5 minutes, including:  Gentle stretching in all planes and light desensitization over anterior ankle    neuromuscular re-education activities to  improve: Balance, Coordination, Kinesthetic Sense, Proprioception, and Posture for 18 billable minutes. The following activities were included:  Quad sets x 4 min w/ NMES  Short arc quad x 4 min w/ NMES and strap  Straight leg raise x 4 min w/ NMES and strap   Long arc quad 2 x 8 (only able to go from about 90-40)  Seated wobble board plantarflexion/dorsiflexion x 30    (Not today)  MRE - 4 way ankle - 3sh   Short foot x 20      therapeutic activities to improve functional performance for 10 minutes, including:  Weight shift onto right lower extremity at counter - 10 x 10sh  Closed chain terminal knee extension - ball on wall - 5sh x 20    gait training to improve functional mobility and safety for 0  minutes, including:  - will not put any weight on right lower extremity     direct contact modalities after being cleared for contraindications:     supervised modalities after being cleared for contradictions:   IFC Electrical Stimulation:  Iam received IFC Electrical Stimulation for pain control applied to the right knee. Pt received stimulation at 50 % scan at a frequency of  for 0 minutes. Iam tolderated treatment well without any adverse effects.  Right knee and ankle with CPs    NMES Electrical Stimulation:  Iam received NMES Electrical Stimulation to elicit muscle contraction of the right quad. Pt received stimulation at a rate of 50 pps with symmetric current, ramp of 2 seconds with 6 second on time and 15 second off time for a total of 12 minutes. Patient tolerated treatment well without any adverse effects.     Vasopneumatic/Game Ready to right ankle for 0 minutes - medium compression - end of bed elevated - to decrease pain and swelling    biofeedback to isolate quad contraction, decrease cocontraction, and assist with neuromuscular reeducation for 0 minutes. Exercises performed with biofeedback Including: (not performed today)       Patient Education and Home Exercises       Education  provided:   - review of home exercise program and current Plan of Care/rationale of treatment.    Written Home Exercises Provided: Patient instructed to cont prior HEP. Exercises were reviewed and Iam was able to demonstrate them prior to the end of the session.  Iam demonstrated good understanding of the education provided. See EMR under Patient Instructions for exercises provided during therapy sessions    Assessment     At Evaluation:  Iam is a 44 y.o. male referred to outpatient Physical Therapy with a medical diagnosis of tear of anterior talofibular ligament in right ankle. Patient presents with significant pain, swelling and loss of range of motion in right ankle. He is in a tall walking boot. He also has significant pain, swelling and decreased range of motion in right knee as well. He states the knee is the reason he can't put any weight on the right lower extremity despite being weight bearing as tolerated per MD orders. He says he is going to get the fluid drawn off later today. Reports h/o right knee surgery in 2012 for torn meniscus. He has an MRI confirmed tear of the anterior talofibular ligament in the right ankle. He has had no advanced imaging of the knee. He states his knee has done this before and drawing the fluid off helped so he is hopeful his knee symptoms will resolve. 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     Current Assessment:  Iam continues to ambulate nonweightbearing with bilateral axillary crutches. Used Russian NMES to right quads today. Able to get good contraction with electrical stimulation. Had him perform quad sets, short arc quads, and straight leg raise in conjunction using strap with the latter two activities. Worked on weight shifting at counter and added closed chain terminal knee extension with ball on wall today. Encouraged patient to let foot rest on the floor  while standing and transfer as much weight as possible to it. Explained to him the quad is getting weaker by avoiding putting at least some weight on it while walking. This is also preventing his ankle from improving. He did have improved flexion range of motion today measuring 105 degrees compared to only 88 last visit. Will continue to address overall reduction in edema, pain and inflammation of right ankle and knee while working to improve range of motion, quad function and ankle instability.     Iam Is progressing towards his goals.   Pt prognosis is Good.     Pt will continue to benefit from skilled outpatient physical therapy to address the deficits listed in the problem list box on initial evaluation, provide pt/family education and to maximize pt's level of independence in the home and community environment.     Pt's spiritual, cultural and educational needs considered and pt agreeable to plan of care and goals.     Anticipated barriers to physical therapy: none    Goals:   SHORT TERM GOALS  Patient to be independent with home exercise program to facilitate carryover between therapy visits.  Patient will have +5 degrees dorsiflexion, 15 degrees inversion and 10 degrees eversion range of motion right ankle for improved gait and functional mobility.  Patient will have 0-120 degrees range of motion right knee for improved gait and functional mobility.  Patient will increase manual muscle test of right ankle and knee to 4+ to 5/5 for increased stability with gait and activiites of daily living.     LONG TERM GOALS  Patient will go up/down stairs reciprocal pattern without handrails and up/down a ladder and with good eccentric control on right lower extremity.  Patient will ambulate independent without assistive device, without deviation and without pain in right knee or ankle.  Patient will be able to return to work full duty as a  for [employer].    Plan     Plan of care Certification:  11/7/2024 to 01/31/2025.     Outpatient Physical Therapy 3 times weekly for 4 weeks, then 2 times weekly for 6 weeks to include the following interventions: 50014 [therapeutic exercise], 45420 [neuromuscular re-education], 24032 [gait training], 20633 [manual therapy], 46988 [therapeutic activities], 68004 [unattended electrical stimulation], 93483 [biofeedback by any modality], and 66820 [vasopneumatic device].     TATE LUTHER, PT   12/04/2024

## 2024-12-05 ENCOUNTER — CLINICAL SUPPORT (OUTPATIENT)
Dept: REHABILITATION | Facility: HOSPITAL | Age: 44
End: 2024-12-05
Payer: OTHER MISCELLANEOUS

## 2024-12-05 DIAGNOSIS — M25.661 DECREASED ROM OF RIGHT KNEE: ICD-10-CM

## 2024-12-05 DIAGNOSIS — M79.604 RIGHT LEG PAIN: ICD-10-CM

## 2024-12-05 DIAGNOSIS — R26.2 DIFFICULTY WALKING: ICD-10-CM

## 2024-12-05 DIAGNOSIS — S93.491D SPRAIN OF ANTERIOR TALOFIBULAR LIGAMENT OF RIGHT ANKLE, SUBSEQUENT ENCOUNTER: Primary | ICD-10-CM

## 2024-12-05 DIAGNOSIS — M25.671 STIFFNESS OF RIGHT ANKLE JOINT: ICD-10-CM

## 2024-12-05 PROCEDURE — 97530 THERAPEUTIC ACTIVITIES: CPT | Mod: CQ

## 2024-12-05 PROCEDURE — 97112 NEUROMUSCULAR REEDUCATION: CPT | Mod: CQ

## 2024-12-05 PROCEDURE — 97110 THERAPEUTIC EXERCISES: CPT | Mod: CQ

## 2024-12-05 NOTE — PROGRESS NOTES
[facility]   Physical Therapy Treatment Note      Name: Iam Morillo  Clinic Number: [id number]    Therapy Diagnosis: Sprain of anterior talofibular ligament of right ankle and ACL sprain    Physician: Yonatan Cross MD    Visit Date: 12/5/2024    Physician Orders: PT Eval and Treat   Medical Diagnosis from Referral: see above  Evaluation Date: 11/7/2024  Authorization Period Expiration: 10/29/2026  Plan of Care Expiration: 01/31/2025     Date of Surgery: n/a  Visit # / Visits authorized: 9/24   FOTO: 1/ 3 = 38    2/3 = 30     Precautions: Standard     PTA Visit #: 1/5     Time In: 10:09 am  Time Out: 10:55 am  Total Billable Time: 46 billable minutes     Subjective     Pt reports: knee continues to swell and be very painful - injection has not decreased the fluid  He was compliant with home exercise program.  Response to previous treatment: no complaints   Functional change: no change noted    Pain: 3/10 at rest, 7-8/10 with movement    Location: right knee and ankle    Objective      5-105 degrees right knee range of motion (improved from 88 degrees flexion last visit)    Treatment     Iam received the treatments listed below:      therapeutic exercises to develop strength, endurance, ROM, flexibility, posture, and core stabilization for 16 billable minutes including:  Calf stretch with towel - 4 x 20sh  Ankle dorsiflexion/plantarflexion - yellow x 30 each  Ankle eversion/inversion - yellow 2 x 10 inversion, x 20 eversion   Seated calf stretch - soleus 5 x 20sh    (Not today)  NuStep -  Hamstring rolls x 30  Seated dorsiflexion x 30  Seated plantarflexion x 30  Heel prop with CP    manual therapy techniques: Joint mobilizations, Manual traction, Myofacial release, Soft tissue Mobilization, and Friction Massage were applied to the: ankle for 4 minutes, including:  Gentle stretching in all planes and light desensitization over anterior ankle    neuromuscular re-education  activities to improve: Balance, Coordination, Kinesthetic Sense, Proprioception, and Posture for 19 billable minutes. The following activities were included:  Quad sets x 5 min w/ NMES  Short arc quad x 5 min w/ NMES and strap  Straight leg raise x 5 min w/ NMES and strap   Long arc quad 2 x 8 (only able to go from about 90-40)  Seated wobble board plantarflexion/dorsiflexion x 30    (Not today)  MRE - 4 way ankle - 3sh   Short foot x 20      therapeutic activities to improve functional performance for 9 minutes, including:  Weight shift onto right lower extremity at counter - 10 x 10sh  Closed chain terminal knee extension - ball on wall - 5sh x 20    gait training to improve functional mobility and safety for 0  minutes, including:  - will not put any weight on right lower extremity     direct contact modalities after being cleared for contraindications:     supervised modalities after being cleared for contradictions:   IFC Electrical Stimulation:  Iam received IFC Electrical Stimulation for pain control applied to the right knee. Pt received stimulation at 50 % scan at a frequency of  for 0 minutes. Iam tolderated treatment well without any adverse effects.  Right knee and ankle with CPs    NMES Electrical Stimulation:  Iam received NMES Electrical Stimulation to elicit muscle contraction of the right quad. Pt received stimulation at a rate of 50 pps with symmetric current, ramp of 2 seconds with 6 second on time and 15 second off time for a total of 15 minutes. Patient tolerated treatment well without any adverse effects.     Vasopneumatic/Game Ready to right ankle for 0 minutes - medium compression - end of bed elevated - to decrease pain and swelling    biofeedback to isolate quad contraction, decrease cocontraction, and assist with neuromuscular reeducation for 0 minutes. Exercises performed with biofeedback Including: (not performed today)       Patient Education and Home Exercises        Education provided:   - review of home exercise program and current Plan of Care/rationale of treatment.    Written Home Exercises Provided: Patient instructed to cont prior HEP. Exercises were reviewed and Iam was able to demonstrate them prior to the end of the session.  Iam demonstrated good understanding of the education provided. See EMR under Patient Instructions for exercises provided during therapy sessions    Assessment     At Evaluation:  Iam is a 44 y.o. male referred to outpatient Physical Therapy with a medical diagnosis of tear of anterior talofibular ligament in right ankle. Patient presents with significant pain, swelling and loss of range of motion in right ankle. He is in a tall walking boot. He also has significant pain, swelling and decreased range of motion in right knee as well. He states the knee is the reason he can't put any weight on the right lower extremity despite being weight bearing as tolerated per MD orders. He says he is going to get the fluid drawn off later today. Reports h/o right knee surgery in 2012 for torn meniscus. He has an MRI confirmed tear of the anterior talofibular ligament in the right ankle. He has had no advanced imaging of the knee. He states his knee has done this before and drawing the fluid off helped so he is hopeful his knee symptoms will resolve. 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     Current Assessment:  Iam continues to ambulate nonweightbearing with bilateral axillary crutches. He did well with weight shifting in clinic. Used Russian NMES to right quads today. Able to get fair contraction before electrical stimulation but had a good contraction with it.  Had him perform quad sets, short arc quads, and straight leg raise in conjunction using strap with the latter two activities.  Encouraged patient to let foot rest on the floor while  standing and transfer as much weight as possible to it. Explained to him the quad is getting weaker by avoiding putting at least some weight on it while walking. This is also preventing his ankle from improving.  Will continue to address overall reduction in edema, pain and inflammation of right ankle and knee while working to improve range of motion, quad function and ankle instability.     Iam Is progressing towards his goals.   Pt prognosis is Good.     Pt will continue to benefit from skilled outpatient physical therapy to address the deficits listed in the problem list box on initial evaluation, provide pt/family education and to maximize pt's level of independence in the home and community environment.     Pt's spiritual, cultural and educational needs considered and pt agreeable to plan of care and goals.     Anticipated barriers to physical therapy: none    Goals:   SHORT TERM GOALS  Patient to be independent with home exercise program to facilitate carryover between therapy visits.  Patient will have +5 degrees dorsiflexion, 15 degrees inversion and 10 degrees eversion range of motion right ankle for improved gait and functional mobility.  Patient will have 0-120 degrees range of motion right knee for improved gait and functional mobility.  Patient will increase manual muscle test of right ankle and knee to 4+ to 5/5 for increased stability with gait and activiites of daily living.     LONG TERM GOALS  Patient will go up/down stairs reciprocal pattern without handrails and up/down a ladder and with good eccentric control on right lower extremity.  Patient will ambulate independent without assistive device, without deviation and without pain in right knee or ankle.  Patient will be able to return to work full duty as a  for [email or web address].    Plan     Plan of care Certification: 11/7/2024 to 01/31/2025.     Outpatient Physical Therapy 3 times weekly for 4 weeks, then 2 times weekly for 6  weeks to include the following interventions: 84456 [therapeutic exercise], 80044 [neuromuscular re-education], 63798 [gait training], 97383 [manual therapy], 81207 [therapeutic activities], 71260 [unattended electrical stimulation], 63974 [biofeedback by any modality], and 10947 [vasopneumatic device].     Matilda Garza, PTA   12/05/2024

## 2024-12-09 ENCOUNTER — CLINICAL SUPPORT (OUTPATIENT)
Dept: REHABILITATION | Facility: HOSPITAL | Age: 44
End: 2024-12-09
Payer: OTHER MISCELLANEOUS

## 2024-12-09 DIAGNOSIS — M79.604 RIGHT LEG PAIN: ICD-10-CM

## 2024-12-09 DIAGNOSIS — R26.2 DIFFICULTY WALKING: ICD-10-CM

## 2024-12-09 DIAGNOSIS — M25.661 DECREASED ROM OF RIGHT KNEE: ICD-10-CM

## 2024-12-09 DIAGNOSIS — S93.491D SPRAIN OF ANTERIOR TALOFIBULAR LIGAMENT OF RIGHT ANKLE, SUBSEQUENT ENCOUNTER: Primary | ICD-10-CM

## 2024-12-09 DIAGNOSIS — M25.671 STIFFNESS OF RIGHT ANKLE JOINT: ICD-10-CM

## 2024-12-09 PROCEDURE — 97112 NEUROMUSCULAR REEDUCATION: CPT

## 2024-12-09 PROCEDURE — 97110 THERAPEUTIC EXERCISES: CPT

## 2024-12-09 PROCEDURE — 97530 THERAPEUTIC ACTIVITIES: CPT

## 2024-12-09 PROCEDURE — 97014 ELECTRIC STIMULATION THERAPY: CPT

## 2024-12-09 NOTE — PROGRESS NOTES
[facility]   Physical Therapy Treatment Note      Name: Iam Morillo  Clinic Number: [id number]    Therapy Diagnosis: Sprain of anterior talofibular ligament of right ankle and ACL sprain    Physician: Yonatan Cross MD    Visit Date: 12/9/2024    Physician Orders: PT Eval and Treat   Medical Diagnosis from Referral: see above  Evaluation Date: 11/7/2024  Authorization Period Expiration: 10/29/2026  Plan of Care Expiration: 01/31/2025     Date of Surgery: n/a  Visit # / Visits authorized: 10/24   FOTO: 1/ 3 = 38    2/3 = 30     Precautions: Standard     PTA Visit #: 0/5     Time In: 10:04 am  Time Out: 10:48 am  Total Billable Time: 44 billable minutes     Subjective     Pt reports: arrived without boot and was putting weight through right lower extremity   He was compliant with home exercise program.  Response to previous treatment: no complaints   Functional change: no change noted    Pain: 3/10 at rest, 7-8/10 with movement    Location: right knee     Objective      5-105 degrees right knee range of motion (improved from 88 degrees flexion last visit)    Treatment     Iam received the treatments listed below:      therapeutic exercises to develop strength, endurance, ROM, flexibility, posture, and core stabilization for 16 billable minutes including:  Bike x 6 minutes  Slant board stretch 4 x 20sh  Ankle circles - green x 20 each direction  Seated hamstring curls - 3 plates 3 x 15 - right    (Not today)  Calf stretch with towel - 4 x 20sh  Ankle dorsiflexion/plantarflexion - yellow x 30 each  Ankle eversion/inversion - yellow 2 x 10 inversion, x 20 eversion   Seated calf stretch - soleus 5 x 20sh  Hamstring rolls x 30  Seated dorsiflexion x 30  Seated plantarflexion x 30      manual therapy techniques: Joint mobilizations, Manual traction, Myofacial release, Soft tissue Mobilization, and Friction Massage were applied to the: ankle for 0 minutes, including:  Gentle  stretching in all planes and light desensitization over anterior ankle    neuromuscular re-education activities to improve: Balance, Coordination, Kinesthetic Sense, Proprioception, and Posture for 18 billable minutes. The following activities were included:  Quad sets x 5 min w/ NMES  Short arc quad x 5 min w/ NMES and strap  Straight leg raise x 5 min w/ NMES and strap   Long arc quad 2 x 8 (only able to go from about 90-40)    (Not today)  MRE - 4 way ankle - 3sh   Short foot x 20    Seated wobble board plantarflexion/dorsiflexion x 30    therapeutic activities to improve functional performance for 10 minutes, including:  Weight shift onto right lower extremity at counter - 10 x 10sh  Closed chain terminal knee extension - ball on wall - 5sh x 20  Seated calf raise - 1 plate 3 x 10    gait training to improve functional mobility and safety for 0  minutes, including:  - will not put any weight on right lower extremity     direct contact modalities after being cleared for contraindications:     supervised modalities after being cleared for contradictions:   IFC Electrical Stimulation:  Iam received IFC Electrical Stimulation for pain control applied to the right knee. Pt received stimulation at 50 % scan at a frequency of  for 0 minutes. Iam tolderated treatment well without any adverse effects.  Right knee and ankle with CPs    NMES Electrical Stimulation:  Iam received NMES Electrical Stimulation to elicit muscle contraction of the right quad. Pt received stimulation at a rate of 50 pps with symmetric current, ramp of 2 seconds with 10 second on time and 20 second off time for a total of 15 minutes. Patient tolerated treatment well without any adverse effects.     Vasopneumatic/Game Ready to right ankle for 0 minutes - medium compression - end of bed elevated - to decrease pain and swelling    biofeedback to isolate quad contraction, decrease cocontraction, and assist with neuromuscular  reeducation for 0 minutes. Exercises performed with biofeedback Including: (not performed today)       Patient Education and Home Exercises       Education provided:   - review of home exercise program and current Plan of Care/rationale of treatment.    Written Home Exercises Provided: Patient instructed to cont prior HEP. Exercises were reviewed and Iam was able to demonstrate them prior to the end of the session.  Iam demonstrated good understanding of the education provided. See EMR under Patient Instructions for exercises provided during therapy sessions    Assessment     At Evaluation:  Iam is a 44 y.o. male referred to outpatient Physical Therapy with a medical diagnosis of tear of anterior talofibular ligament in right ankle. Patient presents with significant pain, swelling and loss of range of motion in right ankle. He is in a tall walking boot. He also has significant pain, swelling and decreased range of motion in right knee as well. He states the knee is the reason he can't put any weight on the right lower extremity despite being weight bearing as tolerated per MD orders. He says he is going to get the fluid drawn off later today. Reports h/o right knee surgery in 2012 for torn meniscus. He has an MRI confirmed tear of the anterior talofibular ligament in the right ankle. He has had no advanced imaging of the knee. He states his knee has done this before and drawing the fluid off helped so he is hopeful his knee symptoms will resolve. Iam is a  for [employer] so he has a labor intensive job to return to which will require extensive therapy.     Current Assessment:  Iam arrived today without boot and was finally putting some weight on the right lower extremity. He was able to ride the bike and make complete revolutions. He said the boot and ACL brace was too much to wear together. He still has fluid on his knee and a lot of pain. The ankle is not bothering him too  much.  Continuing to use [place] NMES to right quads  as he has some notable atrophy. He did better with weight shifting today. Added seated calf raise with very light weight and seated hamstring curls. Long arc quads are still very painful to him and he cannot get full extension.  Will continue to address overall reduction in edema, pain and inflammation of right ankle and knee while working to improve range of motion, quad function and ankle instability.     Iam Is progressing towards his goals.   Pt prognosis is Good.     Pt will continue to benefit from skilled outpatient physical therapy to address the deficits listed in the problem list box on initial evaluation, provide pt/family education and to maximize pt's level of independence in the home and community environment.     Pt's spiritual, cultural and educational needs considered and pt agreeable to plan of care and goals.     Anticipated barriers to physical therapy: none    Goals:   SHORT TERM GOALS  Patient to be independent with home exercise program to facilitate carryover between therapy visits.  Patient will have +5 degrees dorsiflexion, 15 degrees inversion and 10 degrees eversion range of motion right ankle for improved gait and functional mobility.  Patient will have 0-120 degrees range of motion right knee for improved gait and functional mobility.  Patient will increase manual muscle test of right ankle and knee to 4+ to 5/5 for increased stability with gait and activiites of daily living.     LONG TERM GOALS  Patient will go up/down stairs reciprocal pattern without handrails and up/down a ladder and with good eccentric control on right lower extremity.  Patient will ambulate independent without assistive device, without deviation and without pain in right knee or ankle.  Patient will be able to return to work full duty as a  for [employer].    Plan     Plan of care Certification: 11/7/2024 to 01/31/2025.     Outpatient  Physical Therapy 3 times weekly for 4 weeks, then 2 times weekly for 6 weeks to include the following interventions: 30529 [therapeutic exercise], 10048 [neuromuscular re-education], 58618 [gait training], 87712 [manual therapy], 14912 [therapeutic activities], 45684 [unattended electrical stimulation], 42244 [biofeedback by any modality], and 72826 [vasopneumatic device].     TATE LUTHER, PT   12/09/2024

## 2024-12-11 ENCOUNTER — CLINICAL SUPPORT (OUTPATIENT)
Dept: REHABILITATION | Facility: HOSPITAL | Age: 44
End: 2024-12-11
Payer: OTHER MISCELLANEOUS

## 2024-12-11 ENCOUNTER — TELEPHONE (OUTPATIENT)
Dept: ORTHOPEDICS | Facility: CLINIC | Age: 44
End: 2024-12-11
Payer: COMMERCIAL

## 2024-12-11 DIAGNOSIS — R26.2 DIFFICULTY WALKING: ICD-10-CM

## 2024-12-11 DIAGNOSIS — M79.604 RIGHT LEG PAIN: ICD-10-CM

## 2024-12-11 DIAGNOSIS — S93.491D SPRAIN OF ANTERIOR TALOFIBULAR LIGAMENT OF RIGHT ANKLE, SUBSEQUENT ENCOUNTER: Primary | ICD-10-CM

## 2024-12-11 DIAGNOSIS — M25.671 STIFFNESS OF RIGHT ANKLE JOINT: ICD-10-CM

## 2024-12-11 DIAGNOSIS — M25.661 DECREASED ROM OF RIGHT KNEE: ICD-10-CM

## 2024-12-11 PROCEDURE — 97110 THERAPEUTIC EXERCISES: CPT | Mod: CQ

## 2024-12-11 PROCEDURE — 97112 NEUROMUSCULAR REEDUCATION: CPT | Mod: CQ

## 2024-12-11 PROCEDURE — 97530 THERAPEUTIC ACTIVITIES: CPT | Mod: CQ

## 2024-12-11 PROCEDURE — 97014 ELECTRIC STIMULATION THERAPY: CPT | Mod: CQ

## 2024-12-11 NOTE — PROGRESS NOTES
[facility]   Physical Therapy Treatment Note      Name: Iam Morillo  Clinic Number: [id number]    Therapy Diagnosis: Sprain of anterior talofibular ligament of right ankle and ACL sprain    Physician: Yonatan Cross MD    Visit Date: 12/11/2024    Physician Orders: PT Eval and Treat   Medical Diagnosis from Referral: see above  Evaluation Date: 11/7/2024  Authorization Period Expiration: 10/29/2026  Plan of Care Expiration: 01/31/2025     Date of Surgery: n/a  Visit # / Visits authorized: 11/24   FOTO: 1/ 3 = 38    2/3 = 30     Precautions: Standard     PTA Visit #: 1/5     Time In: 10:14 am  Time Out: 11:15 am  Total Billable Time: 38 billable minutes     Subjective     Pt reports: he was really sore after last treatment. Arrived 14 minutes late today.  He was compliant with home exercise program.  Response to previous treatment: no complaints   Functional change: no change noted    Pain: 3/10 at rest, 7-8/10 with movement    Location: right knee     Objective      -15 degrees right knee resting extension on arrival     Treatment     Iam received the treatments listed below:      therapeutic exercises to develop strength, endurance, ROM, flexibility, posture, and core stabilization for 15 billable minutes including:  Bike x 6 minutes  Slant board stretch 4 x 20sh  Ideal stretch 10 x 10 second hold   Ankle circles - green x 20 each direction  Seated hamstring curls - 3 plates 3 x 15 - right (not performed today)       manual therapy techniques: Joint mobilizations, Manual traction, Myofacial release, Soft tissue Mobilization, and Friction Massage were applied to the: ankle for 0 minutes, including:  Gentle stretching in all planes and light desensitization over anterior ankle    neuromuscular re-education activities to improve: Balance, Coordination, Kinesthetic Sense, Proprioception, and Posture for 15 billable minutes. The following activities were included:  Quad sets x  5 min w/ NMES  Short arc quad x 5 min w/ NMES and strap  Straight leg raise x 5 min w/ NMES and strap   Long arc quad 2 x 8 (only able to go from about 90-40) (not performed today)     (Not today)  MRE - 4 way ankle - 3sh   Short foot x 20    Seated wobble board plantarflexion/dorsiflexion x 30    therapeutic activities to improve functional performance for 8 minutes, including:  Weight shift onto right lower extremity at counter - 10 x 10sh  Closed chain terminal knee extension - ball on wall - 5sh x 20  Seated calf raise - 1 plate 3 x 10 (not performed today)     gait training to improve functional mobility and safety for 0  minutes, including:  - will not put any weight on right lower extremity     direct contact modalities after being cleared for contraindications:     supervised modalities after being cleared for contradictions:   IFC Electrical Stimulation:  Iam received IFC Electrical Stimulation for pain control applied to the right knee. Pt received stimulation at 50 % scan at a frequency of  for 0 minutes. Iam tolerated treatment well without any adverse effects.  Right knee and ankle with CPs    NMES Electrical Stimulation:  Iam received NMES Electrical Stimulation to elicit muscle contraction of the right quad. Pt received stimulation at a rate of 50 pps with symmetric current, ramp of 2 seconds with 10 second on time and 20 second off time for a total of 15 minutes. Patient tolerated treatment well without any adverse effects.     Vasopneumatic/Game Ready to right ankle for 0 minutes - medium compression - end of bed elevated - to decrease pain and swelling    biofeedback to isolate quad contraction, decrease cocontraction, and assist with neuromuscular reeducation for 0 minutes. Exercises performed with biofeedback Including: (not performed today)       Patient Education and Home Exercises       Education provided:   - review of home exercise program and current Plan of Care/rationale  of treatment.    Written Home Exercises Provided: Patient instructed to cont prior HEP. Exercises were reviewed and Iam was able to demonstrate them prior to the end of the session.  Iam demonstrated good understanding of the education provided. See EMR under Patient Instructions for exercises provided during therapy sessions    Assessment     At Evaluation:  Iam is a 44 y.o. male referred to outpatient Physical Therapy with a medical diagnosis of tear of anterior talofibular ligament in right ankle. Patient presents with significant pain, swelling and loss of range of motion in right ankle. He is in a tall walking boot. He also has significant pain, swelling and decreased range of motion in right knee as well. He states the knee is the reason he can't put any weight on the right lower extremity despite being weight bearing as tolerated per MD orders. He says he is going to get the fluid drawn off later today. Reports h/o right knee surgery in 2012 for torn meniscus. He has an MRI confirmed tear of the anterior talofibular ligament in the right ankle. He has had no advanced imaging of the knee. He states his knee has done this before and drawing the fluid off helped so he is hopeful his knee symptoms will resolve. 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     Current Assessment:  Iam arrived today without weightbearing through right lower extremity. He stated that he did more than he was told to do on the leg curl and now he is very sore in posterior knee. His resting extension is 10 degrees worse than previous session. He was reminded to put his foot on the floor with each step - stated that he was trying to get inside quickly since he was late. He was able to reach 5 degrees extension by end of neuromuscular electrical stimulation.  Held hamstring curls and seated calf raise due to soreness. He ambulated out  of clinic with partial weightbearing. Will continue to address overall reduction in edema, pain and inflammation of right ankle and knee while working to improve range of motion, quad function and ankle instability.     Iam Is progressing towards his goals.   Pt prognosis is Good.     Pt will continue to benefit from skilled outpatient physical therapy to address the deficits listed in the problem list box on initial evaluation, provide pt/family education and to maximize pt's level of independence in the home and community environment.     Pt's spiritual, cultural and educational needs considered and pt agreeable to plan of care and goals.     Anticipated barriers to physical therapy: none    Goals:   SHORT TERM GOALS  Patient to be independent with home exercise program to facilitate carryover between therapy visits.  Patient will have +5 degrees dorsiflexion, 15 degrees inversion and 10 degrees eversion range of motion right ankle for improved gait and functional mobility.  Patient will have 0-120 degrees range of motion right knee for improved gait and functional mobility.  Patient will increase manual muscle test of right ankle and knee to 4+ to 5/5 for increased stability with gait and activiites of daily living.     LONG TERM GOALS  Patient will go up/down stairs reciprocal pattern without handrails and up/down a ladder and with good eccentric control on right lower extremity.  Patient will ambulate independent without assistive device, without deviation and without pain in right knee or ankle.  Patient will be able to return to work full duty as a  for [employer].    Plan     Plan of care Certification: 11/7/2024 to 01/31/2025.     Outpatient Physical Therapy 3 times weekly for 4 weeks, then 2 times weekly for 6 weeks to include the following interventions: 51219 [therapeutic exercise], 90423 [neuromuscular re-education], 96728 [gait training], 82362 [manual therapy], 86490  [therapeutic activities], 88641 [unattended electrical stimulation], 80395 [biofeedback by any modality], and 31061 [vasopneumatic device].     Matilda Garza, PTA   12/11/2024

## 2024-12-11 NOTE — TELEPHONE ENCOUNTER
----- Message from Gemma sent at 12/11/2024 11:49 AM CST -----  Regarding: Requesting Info on Knee Brace  Who Called: Hannah from [employer]    Hannah is wanting details on an order for the patient's Right ACL brace    Preferred Method of Contact: Phone Call  Patient's Preferred Phone Number on File: [phone]

## 2024-12-12 ENCOUNTER — CLINICAL SUPPORT (OUTPATIENT)
Dept: REHABILITATION | Facility: HOSPITAL | Age: 44
End: 2024-12-12
Payer: OTHER MISCELLANEOUS

## 2024-12-12 DIAGNOSIS — M25.661 DECREASED ROM OF RIGHT KNEE: ICD-10-CM

## 2024-12-12 DIAGNOSIS — M79.604 RIGHT LEG PAIN: ICD-10-CM

## 2024-12-12 DIAGNOSIS — M25.671 STIFFNESS OF RIGHT ANKLE JOINT: ICD-10-CM

## 2024-12-12 DIAGNOSIS — S93.491D SPRAIN OF ANTERIOR TALOFIBULAR LIGAMENT OF RIGHT ANKLE, SUBSEQUENT ENCOUNTER: Primary | ICD-10-CM

## 2024-12-12 DIAGNOSIS — R26.2 DIFFICULTY WALKING: ICD-10-CM

## 2024-12-12 PROCEDURE — 97014 ELECTRIC STIMULATION THERAPY: CPT | Mod: CQ

## 2024-12-12 PROCEDURE — 97112 NEUROMUSCULAR REEDUCATION: CPT | Mod: CQ

## 2024-12-12 PROCEDURE — 97530 THERAPEUTIC ACTIVITIES: CPT | Mod: CQ

## 2024-12-12 PROCEDURE — 97110 THERAPEUTIC EXERCISES: CPT | Mod: CQ

## 2024-12-12 NOTE — PROGRESS NOTES
[facility]   Physical Therapy Treatment Note      Name: Iam Morillo  Clinic Number: [id number]    Therapy Diagnosis: Sprain of anterior talofibular ligament of right ankle and ACL sprain    Physician: Yonatan Cross MD    Visit Date: 12/12/2024    Physician Orders: PT Eval and Treat   Medical Diagnosis from Referral: see above  Evaluation Date: 11/7/2024  Authorization Period Expiration: 10/29/2026  Plan of Care Expiration: 01/31/2025     Date of Surgery: n/a  Visit # / Visits authorized: 12/24   FOTO: 1/ 3 = 38    2/3 = 30     Precautions: Standard     PTA Visit #: 2/5     Time In: 10:01 am  Time Out: 11:04 am  Total Billable Time: 54 billable minutes     Subjective     Pt reports: he is not as sore today.  He was compliant with home exercise program.  Response to previous treatment: no complaints   Functional change: no change noted    Pain: 3/10 at rest, 7-8/10 with movement    Location: right knee     Objective      -6 degrees right knee resting extension on arrival     Treatment     Iam received the treatments listed below:      therapeutic exercises to develop strength, endurance, ROM, flexibility, posture, and core stabilization for 26 billable minutes including:  Bike x 6 minutes  Slant board stretch 4 x 30sh  Ideal stretch 10 x 10 second hold   Ankle circles - green x 20 each direction  Seated hamstring curls - 3 plates 2 x 10       manual therapy techniques: Joint mobilizations, Manual traction, Myofacial release, Soft tissue Mobilization, and Friction Massage were applied to the: ankle for 0 minutes, including:  Gentle stretching in all planes and light desensitization over anterior ankle    neuromuscular re-education activities to improve: Balance, Coordination, Kinesthetic Sense, Proprioception, and Posture for 18 billable minutes. The following activities were included:  Quad sets x 5 min w/ NMES  Short arc quad x 5 min w/ NMES and strap  Straight leg raise x  5 min w/ NMES and strap   Long arc quad 2 x 10 (only able to go from about 90-20)     (Not today)  MRE - 4 way ankle - 3sh   Short foot x 20    Seated wobble board plantarflexion/dorsiflexion x 30    therapeutic activities to improve functional performance for 10 minutes, including:  Weight shift onto right lower extremity at counter - 10 x 10sh  Closed chain terminal knee extension - ball on wall - 5sh x 20  Seated calf raise - 1 plate 3 x 10     gait training to improve functional mobility and safety for 0  minutes, including:  - will not put any weight on right lower extremity     direct contact modalities after being cleared for contraindications:     supervised modalities after being cleared for contradictions:   IFC Electrical Stimulation:  Iam received IFC Electrical Stimulation for pain control applied to the right knee. Pt received stimulation at 50 % scan at a frequency of  for 0 minutes. Iam tolerated treatment well without any adverse effects.  Right knee and ankle with CPs    NMES Electrical Stimulation:  Iam received NMES Electrical Stimulation to elicit muscle contraction of the right quad. Pt received stimulation at a rate of 50 pps with symmetric current, ramp of 2 seconds with 10 second on time and 20 second off time for a total of 15 minutes. Patient tolerated treatment well without any adverse effects.     Vasopneumatic/Game Ready to right ankle for 0 minutes - medium compression - end of bed elevated - to decrease pain and swelling    biofeedback to isolate quad contraction, decrease cocontraction, and assist with neuromuscular reeducation for 0 minutes. Exercises performed with biofeedback Including: (not performed today)       Patient Education and Home Exercises       Education provided:   - review of home exercise program and current Plan of Care/rationale of treatment.    Written Home Exercises Provided: Patient instructed to cont prior HEP. Exercises were reviewed and  Iam was able to demonstrate them prior to the end of the session.  Iam demonstrated good understanding of the education provided. See EMR under Patient Instructions for exercises provided during therapy sessions    Assessment     At Evaluation:  Iam is a 44 y.o. male referred to outpatient Physical Therapy with a medical diagnosis of tear of anterior talofibular ligament in right ankle. Patient presents with significant pain, swelling and loss of range of motion in right ankle. He is in a tall walking boot. He also has significant pain, swelling and decreased range of motion in right knee as well. He states the knee is the reason he can't put any weight on the right lower extremity despite being weight bearing as tolerated per MD orders. He says he is going to get the fluid drawn off later today. Reports h/o right knee surgery in 2012 for torn meniscus. He has an MRI confirmed tear of the anterior talofibular ligament in the right ankle. He has had no advanced imaging of the knee. He states his knee has done this before and drawing the fluid off helped so he is hopeful his knee symptoms will resolve. Iam is a  for [employer] so he has a labor intensive job to return to which will require extensive therapy.     Current Assessment:  Iam arrived today with report of less pain/soreness since he has been working hard to stretch it out at home. Resting extension was much better than last visit. He was able to add back seated leg curl and seated plantarflexion but performed fewer repetitions. He is doing better with weightbearing activities. Will continue to address overall reduction in edema, pain and inflammation of right ankle and knee while working to improve range of motion, quad function and ankle instability.     Iam Is progressing towards his goals.   Pt prognosis is Good.     Pt will continue to benefit from skilled outpatient physical therapy to address the deficits  listed in the problem list box on initial evaluation, provide pt/family education and to maximize pt's level of independence in the home and community environment.     Pt's spiritual, cultural and educational needs considered and pt agreeable to plan of care and goals.     Anticipated barriers to physical therapy: none    Goals:   SHORT TERM GOALS  Patient to be independent with home exercise program to facilitate carryover between therapy visits.  Patient will have +5 degrees dorsiflexion, 15 degrees inversion and 10 degrees eversion range of motion right ankle for improved gait and functional mobility.  Patient will have 0-120 degrees range of motion right knee for improved gait and functional mobility.  Patient will increase manual muscle test of right ankle and knee to 4+ to 5/5 for increased stability with gait and activiites of daily living.     LONG TERM GOALS  Patient will go up/down stairs reciprocal pattern without handrails and up/down a ladder and with good eccentric control on right lower extremity.  Patient will ambulate independent without assistive device, without deviation and without pain in right knee or ankle.  Patient will be able to return to work full duty as a  for [employer].    Plan     Plan of care Certification: 11/7/2024 to 01/31/2025.     Outpatient Physical Therapy 3 times weekly for 4 weeks, then 2 times weekly for 6 weeks to include the following interventions: 11850 [therapeutic exercise], 92466 [neuromuscular re-education], 22762 [gait training], 34228 [manual therapy], 62023 [therapeutic activities], 23256 [unattended electrical stimulation], 35383 [biofeedback by any modality], and 48549 [vasopneumatic device].     Matidla Garza, PTA   12/12/2024

## 2024-12-13 ENCOUNTER — TELEPHONE (OUTPATIENT)
Dept: ORTHOPEDICS | Facility: CLINIC | Age: 44
End: 2024-12-13
Payer: COMMERCIAL

## 2024-12-13 NOTE — TELEPHONE ENCOUNTER
----- Message from Gemma sent at 12/13/2024 10:46 AM CST -----  Regarding: [facility] - Calling for Knee Brace  Who Called: Hannah Lind    Patient is returning phone call    Who Left Message for Patient:Yahir  Does the patient know what this is regarding?: Yes; she asked if she does not answer to leave a VM with info on where the patient got his knee brace. She said he got it on 12/4, but she does not have any info on where he got it from.     Preferred Method of Contact: Phone Call  Patient's Preferred Phone Number on File: [phone]

## 2024-12-13 NOTE — TELEPHONE ENCOUNTER
Called Hannah and she asked where the patient was sent for his knee brace. He was given an order and went to [facility].  I gave her the telephone number to them.

## 2024-12-17 ENCOUNTER — CLINICAL SUPPORT (OUTPATIENT)
Dept: REHABILITATION | Facility: HOSPITAL | Age: 44
End: 2024-12-17
Payer: OTHER MISCELLANEOUS

## 2024-12-17 DIAGNOSIS — S93.491D SPRAIN OF ANTERIOR TALOFIBULAR LIGAMENT OF RIGHT ANKLE, SUBSEQUENT ENCOUNTER: Primary | ICD-10-CM

## 2024-12-17 DIAGNOSIS — R26.2 DIFFICULTY WALKING: ICD-10-CM

## 2024-12-17 DIAGNOSIS — M25.661 DECREASED ROM OF RIGHT KNEE: ICD-10-CM

## 2024-12-17 DIAGNOSIS — M79.604 RIGHT LEG PAIN: ICD-10-CM

## 2024-12-17 DIAGNOSIS — M25.671 STIFFNESS OF RIGHT ANKLE JOINT: ICD-10-CM

## 2024-12-17 PROCEDURE — 97014 ELECTRIC STIMULATION THERAPY: CPT | Mod: CQ

## 2024-12-17 PROCEDURE — 97110 THERAPEUTIC EXERCISES: CPT | Mod: CQ

## 2024-12-17 PROCEDURE — 97112 NEUROMUSCULAR REEDUCATION: CPT | Mod: CQ

## 2024-12-17 NOTE — PROGRESS NOTES
[facility]   Physical Therapy Treatment Note      Name: Iam Morillo  Clinic Number: [id number]    Therapy Diagnosis: Sprain of anterior talofibular ligament of right ankle and ACL sprain    Physician: Yonatan Cross MD    Visit Date: 12/17/2024    Physician Orders: PT Eval and Treat   Medical Diagnosis from Referral: see above  Evaluation Date: 11/7/2024  Authorization Period Expiration: 10/29/2026  Plan of Care Expiration: 01/31/2025     Date of Surgery: n/a  Visit # / Visits authorized: 13/24   FOTO: 1/ 3 = 38    2/3 = 30     Precautions: Standard     PTA Visit #: 3/5     Time In: 10:20 am  Time Out: 11:07 am  Total Billable Time: 44 billable minutes     Subjective     Pt reports: his knee still hurts when he bends it or when he walks on it.  He was compliant with home exercise program.  Response to previous treatment: no complaints   Functional change: no change noted    Pain: 0/10 at rest, 5-6/10 with movement    Location: right knee     Objective      -2 degrees right knee resting extension on arrival, lags to -5 with straight leg raise, 105 degrees flexion      Treatment     Iam received the treatments listed below:      therapeutic exercises to develop strength, endurance, ROM, flexibility, posture, and core stabilization for 23 billable minutes including:  Bike x 5 minutes  Slant board stretch 4 x 30sh  Ideal stretch 10 x 10 second hold   Ankle circles - green x 20 each direction  Seated hamstring curls - 4 plates 3 x 10       manual therapy techniques: Joint mobilizations, Manual traction, Myofacial release, Soft tissue Mobilization, and Friction Massage were applied to the: ankle for 0 minutes, including:  Gentle stretching in all planes and light desensitization over anterior ankle    neuromuscular re-education activities to improve: Balance, Coordination, Kinesthetic Sense, Proprioception, and Posture for 15 billable minutes. The following activities were  included:  Quad sets x 0 min w/ NMES  Short arc quad x 5 min w/ NMES and strap  Straight leg raise x 5 min w/ NMES and strap   Long arc quad 2 x 10 (only able to go from about 90-20)     (Not today)  MRE - 4 way ankle - 3sh   Short foot x 20    Seated wobble board plantarflexion/dorsiflexion x 30    therapeutic activities to improve functional performance for 3 minutes, including:  Weight shift onto right lower extremity at counter -   Closed chain terminal knee extension - ball on wall - 5sh   Seated calf raise - 1 plate 3 x 10     gait training to improve functional mobility and safety for 3  minutes, including:  - patient attempting to ambulate without assistive device in clinic so showed him how to ambulate with unilateral crutch for safer gait. Explained he was keeping knee flexed without assistive device and hitting heelstrike when using one crutch which is more beneficial to him.     direct contact modalities after being cleared for contraindications:     supervised modalities after being cleared for contradictions:   IFC Electrical Stimulation:  Ima received IFC Electrical Stimulation for pain control applied to the right knee. Pt received stimulation at 50 % scan at a frequency of  for 0 minutes. Iam tolerated treatment well without any adverse effects.  Right knee and ankle with CPs    NMES Electrical Stimulation:  Iam received NMES Electrical Stimulation to elicit muscle contraction of the right quad. Pt received stimulation at a rate of 50 pps with symmetric current, ramp of 2 seconds with 10 second on time and 20 second off time for a total of 10 minutes. Patient tolerated treatment well without any adverse effects.     Vasopneumatic/Game Ready to right ankle for 0 minutes - medium compression - end of bed elevated - to decrease pain and swelling    biofeedback to isolate quad contraction, decrease cocontraction, and assist with neuromuscular reeducation for 0 minutes. Exercises  performed with biofeedback Including: (not performed today)       Patient Education and Home Exercises       Education provided:   - review of home exercise program and current Plan of Care/rationale of treatment.    Written Home Exercises Provided: Patient instructed to cont prior HEP. Exercises were reviewed and Iam was able to demonstrate them prior to the end of the session.  Iam demonstrated good understanding of the education provided. See EMR under Patient Instructions for exercises provided during therapy sessions    Assessment     At Evaluation:  Iam is a 44 y.o. male referred to outpatient Physical Therapy with a medical diagnosis of tear of anterior talofibular ligament in right ankle. Patient presents with significant pain, swelling and loss of range of motion in right ankle. He is in a tall walking boot. He also has significant pain, swelling and decreased range of motion in right knee as well. He states the knee is the reason he can't put any weight on the right lower extremity despite being weight bearing as tolerated per MD orders. He says he is going to get the fluid drawn off later today. Reports h/o right knee surgery in 2012 for torn meniscus. He has an MRI confirmed tear of the anterior talofibular ligament in the right ankle. He has had no advanced imaging of the knee. He states his knee has done this before and drawing the fluid off helped so he is hopeful his knee symptoms will resolve. Iam is a  for [employer] so he has a labor intensive job to return to which will require extensive therapy.     Current Assessment:  Iam arrived with report of his leg still hurting with weightbearing and with flexion at knee. He attempted to walk around clinic without assistive device so was taught how to use one crutch for safe ambulation at home. He completed all activities in clinic with minimal complaint. Plan to add leg press next visit.  Will continue to address  overall reduction in edema, pain and inflammation of right ankle and knee while working to improve range of motion, quad function and ankle instability.     Iam Is progressing towards his goals.   Pt prognosis is Good.     Pt will continue to benefit from skilled outpatient physical therapy to address the deficits listed in the problem list box on initial evaluation, provide pt/family education and to maximize pt's level of independence in the home and community environment.     Pt's spiritual, cultural and educational needs considered and pt agreeable to plan of care and goals.     Anticipated barriers to physical therapy: none    Goals:   SHORT TERM GOALS  Patient to be independent with home exercise program to facilitate carryover between therapy visits.  Patient will have +5 degrees dorsiflexion, 15 degrees inversion and 10 degrees eversion range of motion right ankle for improved gait and functional mobility.  Patient will have 0-120 degrees range of motion right knee for improved gait and functional mobility.  Patient will increase manual muscle test of right ankle and knee to 4+ to 5/5 for increased stability with gait and activiites of daily living.     LONG TERM GOALS  Patient will go up/down stairs reciprocal pattern without handrails and up/down a ladder and with good eccentric control on right lower extremity.  Patient will ambulate independent without assistive device, without deviation and without pain in right knee or ankle.  Patient will be able to return to work full duty as a  for [employer].    Plan     Plan of care Certification: 11/7/2024 to 01/31/2025.     Outpatient Physical Therapy 3 times weekly for 4 weeks, then 2 times weekly for 6 weeks to include the following interventions: 03070 [therapeutic exercise], 86139 [neuromuscular re-education], 67897 [gait training], 92949 [manual therapy], 62406 [therapeutic activities], 32460 [unattended electrical stimulation],  71328 [biofeedback by any modality], and 70786 [vasopneumatic device].     Matilda Garza, PTA   12/17/2024

## 2024-12-18 ENCOUNTER — CLINICAL SUPPORT (OUTPATIENT)
Dept: DERMATOLOGY | Facility: CLINIC | Age: 44
End: 2024-12-18
Payer: COMMERCIAL

## 2024-12-18 ENCOUNTER — OFFICE VISIT (OUTPATIENT)
Dept: ORTHOPEDICS | Facility: CLINIC | Age: 44
End: 2024-12-18
Payer: OTHER MISCELLANEOUS

## 2024-12-18 VITALS
HEART RATE: 82 BPM | BODY MASS INDEX: 29.66 KG/M2 | OXYGEN SATURATION: 95 % | WEIGHT: 219 LBS | DIASTOLIC BLOOD PRESSURE: 73 MMHG | HEIGHT: 72 IN | SYSTOLIC BLOOD PRESSURE: 127 MMHG

## 2024-12-18 DIAGNOSIS — S83.511A SPRAIN OF ANTERIOR CRUCIATE LIGAMENT OF RIGHT KNEE, INITIAL ENCOUNTER: Primary | ICD-10-CM

## 2024-12-18 DIAGNOSIS — Z48.89 ENCOUNTER FOR POST SURGICAL WOUND CHECK: Primary | ICD-10-CM

## 2024-12-18 PROCEDURE — 99213 OFFICE O/P EST LOW 20 MIN: CPT | Mod: PBBFAC | Performed by: ORTHOPAEDIC SURGERY

## 2024-12-18 PROCEDURE — 99499 UNLISTED E&M SERVICE: CPT | Mod: ,,, | Performed by: STUDENT IN AN ORGANIZED HEALTH CARE EDUCATION/TRAINING PROGRAM

## 2024-12-18 PROCEDURE — 99999 PR PBB SHADOW E&M-EST. PATIENT-LVL III: CPT | Mod: PBBFAC,,, | Performed by: ORTHOPAEDIC SURGERY

## 2024-12-18 NOTE — PROGRESS NOTES
Patient presents for wound check. Multiple hairs removed from wound on posterior neck. Should heal via second intent.

## 2024-12-18 NOTE — PROGRESS NOTES
Patient is here for follow-up of his ATFL sprain with his right knee ACL partial tear.  He is in the ACL brace.  He is going to therapy they are improving on in his motion but he is still having pain when he tries to stand or walk on in his knee she has little bit of an effusion.  Let him continue with therapy in his biggest complaints in his patellofemoral joint.  I will let him work on range motion strengthening of both the ankle in the knee I will follow back up in 6 weeks.  He is currently off work.

## 2024-12-18 NOTE — LETTER
December 18, 2024      [facility]  [address] [place] [id number]  Phone: [phone]  Fax: [phone]       Patient: Iam Morillo   YOB: 1980  Date of Visit: 12/18/2024    To Whom It May Concern:    Kit Morillo  was at [facility] on 12/18/2024. The patient is to remain off work until next appointment. If you have any questions or concerns, or if I can be of further assistance, please do not hesitate to contact me.    Sincerely,    MD Yahir Pop MA

## 2024-12-19 ENCOUNTER — CLINICAL SUPPORT (OUTPATIENT)
Dept: REHABILITATION | Facility: HOSPITAL | Age: 44
End: 2024-12-19
Payer: OTHER MISCELLANEOUS

## 2024-12-19 DIAGNOSIS — M25.661 DECREASED ROM OF RIGHT KNEE: ICD-10-CM

## 2024-12-19 DIAGNOSIS — R26.2 DIFFICULTY WALKING: ICD-10-CM

## 2024-12-19 DIAGNOSIS — M25.671 STIFFNESS OF RIGHT ANKLE JOINT: ICD-10-CM

## 2024-12-19 DIAGNOSIS — S93.491D SPRAIN OF ANTERIOR TALOFIBULAR LIGAMENT OF RIGHT ANKLE, SUBSEQUENT ENCOUNTER: Primary | ICD-10-CM

## 2024-12-19 DIAGNOSIS — M79.604 RIGHT LEG PAIN: ICD-10-CM

## 2024-12-19 PROCEDURE — 97112 NEUROMUSCULAR REEDUCATION: CPT

## 2024-12-19 PROCEDURE — 97530 THERAPEUTIC ACTIVITIES: CPT

## 2024-12-19 PROCEDURE — 97110 THERAPEUTIC EXERCISES: CPT

## 2024-12-19 NOTE — PROGRESS NOTES
[facility]   Physical Therapy Treatment Note      Name: Iam Morillo  Clinic Number: [id number]    Therapy Diagnosis: Sprain of anterior talofibular ligament of right ankle and ACL sprain    Physician: Yonatan Cross MD    Visit Date: 12/19/2024    Physician Orders: PT Eval and Treat   Medical Diagnosis from Referral: see above  Evaluation Date: 11/7/2024  Authorization Period Expiration: 10/29/2026  Plan of Care Expiration: 01/31/2025     Date of Surgery: n/a  Visit # / Visits authorized: 14/24   FOTO: 1/ 3 = 38    2/3 = 30     Precautions: Standard     PTA Visit #: 0/5     Time In: 10:02 am  Time Out: 10:55 am  Total Billable Time: 50 billable minutes     Subjective     Pt reports: he saw MD yesterday - he still would not draw the fluid off his knee - he arrived without crutches today  He was compliant with home exercise program.  Response to previous treatment: no complaints   Functional change: no change noted    Pain: 0/10 at rest, 5-6/10 with movement    Location: right knee     Objective      -2 degrees right knee resting extension on arrival, lags to -5 with straight leg raise, 105 degrees flexion      Treatment     Iam received the treatments listed below:      therapeutic exercises to develop strength, endurance, ROM, flexibility, posture, and core stabilization for 10 billable minutes including:  Bike x 5 minutes  Seated hamstring curls w/+  5 plates 3 x 10    (Not today)  Ideal stretch 10 x 10 second hold   Ankle circles - green x 20 each direction         manual therapy techniques: Joint mobilizations, Manual traction, Myofacial release, Soft tissue Mobilization, and Friction Massage were applied to the: ankle for 0 minutes, including:  Gentle stretching in all planes and light desensitization over anterior ankle    neuromuscular re-education activities to improve: Balance, Coordination, Kinesthetic Sense, Proprioception, and Posture for 15 billable minutes. The  following activities were included:  Quad sets 10 x 10sh  Short arc quad x 5 min w/ NMES - no strap today  Straight leg raise x 5 min w/ NMES - no strap today    (Not today)  MRE - 4 way ankle - 3sh   Short foot x 20    Seated wobble board plantarflexion/dorsiflexion x 30  Long arc quad 2 x 10 (only able to go from about 90-20)     therapeutic activities to improve functional performance for 25 minutes, including:  Closed chain terminal knee extension - ball on wall - 5sh x 20  Calf raises at rail x 30  Standing rocker board - plantarflexion/dorsiflexion x 30   Bilateral leg press - 8 plates 3 x 10 w/ heel rock for terminal knee extension   Right leg press - 4 plates 3 x 10 w/ heel rock for terminal knee extension     (Not today)  Seated calf raise - 1 plate 3 x 10    gait training to improve functional mobility and safety for 3  minutes, including:  - patient attempting to ambulate without assistive device in clinic so showed him how to ambulate with unilateral crutch for safer gait. Explained he was keeping knee flexed without assistive device and hitting heelstrike when using one crutch which is more beneficial to him.     direct contact modalities after being cleared for contraindications:     supervised modalities after being cleared for contradictions:   IFC Electrical Stimulation:  Iam received IFC Electrical Stimulation for pain control applied to the right knee. Pt received stimulation at 50 % scan at a frequency of  for 0 minutes. Iam tolerated treatment well without any adverse effects.  Right knee and ankle with CPs    NMES Electrical Stimulation:  Iam received NMES Electrical Stimulation to elicit muscle contraction of the right quad. Pt received stimulation at a rate of 50 pps with symmetric current, ramp of 2 seconds with 10 second on time and 20 second off time for a total of 10 minutes. Patient tolerated treatment well without any adverse effects.     Vasopneumatic/Game Ready to  right ankle for 0 minutes - medium compression - end of bed elevated - to decrease pain and swelling    biofeedback to isolate quad contraction, decrease cocontraction, and assist with neuromuscular reeducation for 0 minutes. Exercises performed with biofeedback Including: (not performed today)       Patient Education and Home Exercises       Education provided:   - review of home exercise program and current Plan of Care/rationale of treatment.    Written Home Exercises Provided: Patient instructed to cont prior HEP. Exercises were reviewed and Iam was able to demonstrate them prior to the end of the session.  Iam demonstrated good understanding of the education provided. See EMR under Patient Instructions for exercises provided during therapy sessions    Assessment     At Evaluation:  Iam is a 44 y.o. male referred to outpatient Physical Therapy with a medical diagnosis of tear of anterior talofibular ligament in right ankle. Patient presents with significant pain, swelling and loss of range of motion in right ankle. He is in a tall walking boot. He also has significant pain, swelling and decreased range of motion in right knee as well. He states the knee is the reason he can't put any weight on the right lower extremity despite being weight bearing as tolerated per MD orders. He says he is going to get the fluid drawn off later today. Reports h/o right knee surgery in 2012 for torn meniscus. He has an MRI confirmed tear of the anterior talofibular ligament in the right ankle. He has had no advanced imaging of the knee. He states his knee has done this before and drawing the fluid off helped so he is hopeful his knee symptoms will resolve. 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     Current Assessment:  Iam arrived without crutches today. He saw MD yesterday and was frustrated that he will still not draw  the fluid off of his knee.He arrived without crutches today. He does have a much better quad set on arrival. He still has about a 5 degree quad lag with straight leg raise. We are still using [place] NMES to improve quad strength and control. Had him do his straight leg raise and terminal knee extension without using the strap today.  Added leg press, standing plantarflexion/dorsiflexion on rocker board and standing calf raises today which he tolerated without significant complaint.  Will continue to address overall reduction in edema, pain and inflammation of right ankle and knee while working to improve range of motion, quad function and ankle instability.     Iam Is progressing towards his goals.   Pt prognosis is Good.     Pt will continue to benefit from skilled outpatient physical therapy to address the deficits listed in the problem list box on initial evaluation, provide pt/family education and to maximize pt's level of independence in the home and community environment.     Pt's spiritual, cultural and educational needs considered and pt agreeable to plan of care and goals.     Anticipated barriers to physical therapy: none    Goals:   SHORT TERM GOALS  Patient to be independent with home exercise program to facilitate carryover between therapy visits.  Patient will have +5 degrees dorsiflexion, 15 degrees inversion and 10 degrees eversion range of motion right ankle for improved gait and functional mobility.  Patient will have 0-120 degrees range of motion right knee for improved gait and functional mobility.  Patient will increase manual muscle test of right ankle and knee to 4+ to 5/5 for increased stability with gait and activiites of daily living.     LONG TERM GOALS  Patient will go up/down stairs reciprocal pattern without handrails and up/down a ladder and with good eccentric control on right lower extremity.  Patient will ambulate independent without assistive device, without deviation and  without pain in right knee or ankle.  Patient will be able to return to work full duty as a  for [employer].    Plan     Plan of care Certification: 11/7/2024 to 01/31/2025.     Outpatient Physical Therapy 3 times weekly for 4 weeks, then 2 times weekly for 6 weeks to include the following interventions: 28047 [therapeutic exercise], 67188 [neuromuscular re-education], 12304 [gait training], 71532 [manual therapy], 92883 [therapeutic activities], 33556 [unattended electrical stimulation], 89163 [biofeedback by any modality], and 43495 [vasopneumatic device].     TATE LUTHER, PT   12/19/2024

## 2024-12-24 ENCOUNTER — CLINICAL SUPPORT (OUTPATIENT)
Dept: REHABILITATION | Facility: HOSPITAL | Age: 44
End: 2024-12-24
Payer: OTHER MISCELLANEOUS

## 2024-12-24 DIAGNOSIS — M25.661 DECREASED ROM OF RIGHT KNEE: ICD-10-CM

## 2024-12-24 DIAGNOSIS — M79.604 RIGHT LEG PAIN: ICD-10-CM

## 2024-12-24 DIAGNOSIS — M25.671 STIFFNESS OF RIGHT ANKLE JOINT: ICD-10-CM

## 2024-12-24 DIAGNOSIS — R26.2 DIFFICULTY WALKING: ICD-10-CM

## 2024-12-24 DIAGNOSIS — S93.491A SPRAIN OF ANTERIOR TALOFIBULAR LIGAMENT OF RIGHT ANKLE, INITIAL ENCOUNTER: Primary | ICD-10-CM

## 2024-12-24 PROCEDURE — 97110 THERAPEUTIC EXERCISES: CPT | Mod: CQ

## 2024-12-24 PROCEDURE — 97032 APPL MODALITY 1+ESTIM EA 15: CPT | Mod: CQ

## 2024-12-24 PROCEDURE — 97112 NEUROMUSCULAR REEDUCATION: CPT | Mod: CQ

## 2024-12-24 NOTE — PROGRESS NOTES
[facility]   Physical Therapy Treatment Note      Name: Iam Morillo  Clinic Number: [id number]    Therapy Diagnosis: Sprain of anterior talofibular ligament of right ankle and ACL sprain    Physician: Yonatan Cross MD    Visit Date: 12/24/2024    Physician Orders: PT Eval and Treat   Medical Diagnosis from Referral: see above  Evaluation Date: 11/7/2024  Authorization Period Expiration: 10/29/2026  Plan of Care Expiration: 01/31/2025     Date of Surgery: n/a  Visit # / Visits authorized: 15/24   FOTO: 1/ 3 = 38    2/3 = 30     Precautions: Standard     PTA Visit #: 1/5     Time In: 10:05 am  Time Out: 10:55 am  Total Billable Time: 50 billable minutes     Subjective     Pt reports: he is still  having a lot of fluid in knee and it limits his movement; increased pain with increased walking; wants a second opinion  He was compliant with home exercise program.  Response to previous treatment: no complaints   Functional change: no change noted    Pain: 0/10 at rest, 5-6/10 with movement    Location: right knee     Objective      -4 degrees right knee resting extension on arrival, lags to -12 with straight leg raise, 105 degrees flexion  limited by pain    Treatment     Iam received the treatments listed below:      therapeutic exercises to develop strength, endurance, ROM, flexibility, posture, and core stabilization for 10 billable minutes including:  Bike x 5 minutes  Seated hamstring curls w/+  5 plates 3 x 10  Incline board QUAD SET x 2 min    (Not today)  Ideal stretch 10 x 10 second hold   Ankle circles - green x 20 each direction         manual therapy techniques: Joint mobilizations, Manual traction, Myofacial release, Soft tissue Mobilization, and Friction Massage were applied to the: ankle for 3 minutes, including:  Gentle stretching in all planes and light desensitization over anterior ankle  Patellar mobs   neuromuscular re-education activities to improve: Balance,  Coordination, Kinesthetic Sense, Proprioception, and Posture for 15 billable minutes. The following activities were included:  Quad sets 10 x 10sh  Short arc quad x 5 min w/ NMES - no strap today  Straight leg raise x 5 min w/ NMES - no strap today  LAQ x 5 min w/ NMES   (Not today)  MRE - 4 way ankle - 3sh   Short foot x 20    Seated wobble board plantarflexion/dorsiflexion x 30  Long arc quad 2 x 10 (only able to go from about 90-20)     therapeutic activities to improve functional performance for 25 minutes, including:  Closed chain terminal knee extension - ball on wall - 5sh x 20  Calf raises at rail x 30  Standing rocker board - plantarflexion/dorsiflexion x 30   Bilateral leg press - 8 plates 3 x 10 w/ heel rock for terminal knee extension   Right leg press - 4 plates 3 x 10 w/ heel rock for terminal knee extension     (Not today)  Seated calf raise - 1 plate 3 x 10    gait training to improve functional mobility and safety for   minutes, including:  - patient attempting to ambulate without assistive device in clinic so showed him how to ambulate with unilateral crutch for safer gait. Explained he was keeping knee flexed without assistive device and hitting heelstrike when using one crutch which is more beneficial to him.     direct contact modalities after being cleared for contraindications:     supervised modalities after being cleared for contradictions:   IFC Electrical Stimulation:  Iam received IFC Electrical Stimulation for pain control applied to the right knee. Pt received stimulation at 50 % scan at a frequency of  for 0 minutes. Iam tolerated treatment well without any adverse effects.  Right knee and ankle with Cps- REFUSED SAID IT MADE PAIN WORSE LAST TIME    NMES Electrical Stimulation:  Iam received NMES Electrical Stimulation to elicit muscle contraction of the right quad. Pt received stimulation at a rate of 50 pps with symmetric current, ramp of 2 seconds with 10 second on  time and 20 second off time for a total of 15 minutes. Patient tolerated treatment well without any adverse effects.     Vasopneumatic/Game Ready to right ankle for 0 minutes - medium compression - end of bed elevated - to decrease pain and swelling- REFUSED SAID IT MADE PAIN WORSE    biofeedback to isolate quad contraction, decrease cocontraction, and assist with neuromuscular reeducation for 0 minutes. Exercises performed with biofeedback Including: (not performed today)       Patient Education and Home Exercises       Education provided:   - review of home exercise program and current Plan of Care/rationale of treatment.    Written Home Exercises Provided: Patient instructed to cont prior HEP. Exercises were reviewed and Iam was able to demonstrate them prior to the end of the session.  Iam demonstrated good understanding of the education provided. See EMR under Patient Instructions for exercises provided during therapy sessions    Assessment     At Evaluation:  Iam is a 44 y.o. male referred to outpatient Physical Therapy with a medical diagnosis of tear of anterior talofibular ligament in right ankle. Patient presents with significant pain, swelling and loss of range of motion in right ankle. He is in a tall walking boot. He also has significant pain, swelling and decreased range of motion in right knee as well. He states the knee is the reason he can't put any weight on the right lower extremity despite being weight bearing as tolerated per MD orders. He says he is going to get the fluid drawn off later today. Reports h/o right knee surgery in 2012 for torn meniscus. He has an MRI confirmed tear of the anterior talofibular ligament in the right ankle. He has had no advanced imaging of the knee. He states his knee has done this before and drawing the fluid off helped so he is hopeful his knee symptoms will resolve. Iam is a  for [id number] so he has a labor intensive job to  return to which will require extensive therapy.     Current Assessment:  Iam arrived without crutches today. Range is limited by pain, but there is terminal knee extension weakness as well. Iam Is progressing towards his goals.   Pt prognosis is Good.     Pt will continue to benefit from skilled outpatient physical therapy to address the deficits listed in the problem list box on initial evaluation, provide pt/family education and to maximize pt's level of independence in the home and community environment.     Pt's spiritual, cultural and educational needs considered and pt agreeable to plan of care and goals.     Anticipated barriers to physical therapy: none    Goals:   SHORT TERM GOALS  Patient to be independent with home exercise program to facilitate carryover between therapy visits.  Patient will have +5 degrees dorsiflexion, 15 degrees inversion and 10 degrees eversion range of motion right ankle for improved gait and functional mobility.  Patient will have 0-120 degrees range of motion right knee for improved gait and functional mobility.  Patient will increase manual muscle test of right ankle and knee to 4+ to 5/5 for increased stability with gait and activiites of daily living.     LONG TERM GOALS  Patient will go up/down stairs reciprocal pattern without handrails and up/down a ladder and with good eccentric control on right lower extremity.  Patient will ambulate independent without assistive device, without deviation and without pain in right knee or ankle.  Patient will be able to return to work full duty as a  for [employer].    Plan     Plan of care Certification: 11/7/2024 to 01/31/2025.     Outpatient Physical Therapy 3 times weekly for 4 weeks, then 2 times weekly for 6 weeks to include the following interventions: 63629 [therapeutic exercise], 96253 [neuromuscular re-education], 33388 [gait training], 38232 [manual therapy], 40907 [therapeutic activities], 03178  [unattended electrical stimulation], 90173 [biofeedback by any modality], and 87954 [vasopneumatic device].     Paula Olvera, PTA   12/24/2024

## 2024-12-31 ENCOUNTER — CLINICAL SUPPORT (OUTPATIENT)
Dept: REHABILITATION | Facility: HOSPITAL | Age: 44
End: 2024-12-31
Payer: OTHER MISCELLANEOUS

## 2024-12-31 DIAGNOSIS — M25.661 DECREASED ROM OF RIGHT KNEE: ICD-10-CM

## 2024-12-31 DIAGNOSIS — S93.491A SPRAIN OF ANTERIOR TALOFIBULAR LIGAMENT OF RIGHT ANKLE, INITIAL ENCOUNTER: Primary | ICD-10-CM

## 2024-12-31 DIAGNOSIS — M79.604 RIGHT LEG PAIN: ICD-10-CM

## 2024-12-31 DIAGNOSIS — M25.671 STIFFNESS OF RIGHT ANKLE JOINT: ICD-10-CM

## 2024-12-31 DIAGNOSIS — R26.2 DIFFICULTY WALKING: ICD-10-CM

## 2024-12-31 PROCEDURE — 97014 ELECTRIC STIMULATION THERAPY: CPT | Mod: CQ

## 2024-12-31 PROCEDURE — 97530 THERAPEUTIC ACTIVITIES: CPT | Mod: CQ

## 2024-12-31 PROCEDURE — 97110 THERAPEUTIC EXERCISES: CPT | Mod: CQ

## 2024-12-31 PROCEDURE — 97112 NEUROMUSCULAR REEDUCATION: CPT | Mod: CQ

## 2024-12-31 NOTE — PROGRESS NOTES
[facility]   Physical Therapy Treatment Note      Name: Iam Morillo  Clinic Number: [id number]    Therapy Diagnosis: Sprain of anterior talofibular ligament of right ankle and ACL sprain    Physician: Yonatan Cross MD    Visit Date: 12/31/2024    Physician Orders: PT Eval and Treat   Medical Diagnosis from Referral: see above  Evaluation Date: 11/7/2024  Authorization Period Expiration: 10/29/2026  Plan of Care Expiration: 01/31/2025     Date of Surgery: n/a  Visit # / Visits authorized: 15/24   FOTO: 1/ 3 = 38    2/3 = 30     Precautions: Standard     PTA Visit #: 2/5     Time In: 10:45 am  Time Out: 11:30 am  Total Billable Time: 45 billable minutes     Subjective     Pt reports: He continue to have increased pain with increased walking.    He was compliant with home exercise program.  Response to previous treatment: no complaints   Functional change: no change noted    Pain: 2/10 at rest, 5-6/10 with movement    Location: right knee     Objective      -4 degrees right knee resting extension on arrival, lags to -12 with straight leg raise, 105 degrees flexion  limited by pain    Treatment     Iam received the treatments listed below:      therapeutic exercises to develop strength, endurance, ROM, flexibility, posture, and core stabilization for 10 billable minutes including:  Bike x 5 minutes  Seated hamstring curls w/+  5 plates 3 x 10  Incline board QUAD SET x 2 min    (Not today)  Ideal stretch 10 x 10 second hold   Ankle circles - green x 20 each direction         manual therapy techniques: Joint mobilizations, Manual traction, Myofacial release, Soft tissue Mobilization, and Friction Massage were applied to the: ankle for 0 minutes, including:  Gentle stretching in all planes and light desensitization over anterior ankle  Patellar mobs   neuromuscular re-education activities to improve: Balance, Coordination, Kinesthetic Sense, Proprioception, and Posture for 15  billable minutes. The following activities were included:  Quad sets 10 x 10sh  Short arc quad x 5 min w/ NMES - no strap today  Straight leg raise x 5 min w/ NMES - no strap today  LAQ x 5 min w/ NMES   (Not today)  MRE - 4 way ankle - 3sh   Short foot x 20    Seated wobble board plantarflexion/dorsiflexion x 30  Long arc quad 2 x 10 (only able to go from about 90-20)     therapeutic activities to improve functional performance for 20 minutes, including:  Closed chain terminal knee extension - ball on wall - 5sh x 20  Calf raises at rail x 30  Standing rocker board - plantarflexion/dorsiflexion x 30   Bilateral leg press - 8 plates 3 x 10 w/ heel rock for terminal knee extension   Right leg press - 4 plates 3 x 10 w/ heel rock for terminal knee extension     (Not today)  Seated calf raise - 1 plate 3 x 10    gait training to improve functional mobility and safety for   minutes, including:  - patient attempting to ambulate without assistive device in clinic so showed him how to ambulate with unilateral crutch for safer gait. Explained he was keeping knee flexed without assistive device and hitting heelstrike when using one crutch which is more beneficial to him.     direct contact modalities after being cleared for contraindications:     supervised modalities after being cleared for contradictions:   IFC Electrical Stimulation:  Iam received IFC Electrical Stimulation for pain control applied to the right knee. Pt received stimulation at 50 % scan at a frequency of  for 0 minutes. Iam tolerated treatment well without any adverse effects.  Right knee and ankle with Cps- REFUSED SAID IT MADE PAIN WORSE LAST TIME    NMES Electrical Stimulation:  Iam received NMES Electrical Stimulation to elicit muscle contraction of the right quad. Pt received stimulation at a rate of 50 pps with symmetric current, ramp of 2 seconds with 10 second on time and 20 second off time for a total of 15 minutes. Patient  tolerated treatment well without any adverse effects.     Vasopneumatic/Game Ready to right ankle for 0 minutes - medium compression - end of bed elevated - to decrease pain and swelling- REFUSED SAID IT MADE PAIN WORSE    biofeedback to isolate quad contraction, decrease cocontraction, and assist with neuromuscular reeducation for 0 minutes. Exercises performed with biofeedback Including: (not performed today)       Patient Education and Home Exercises       Education provided:   - review of home exercise program and current Plan of Care/rationale of treatment.    Written Home Exercises Provided: Patient instructed to cont prior HEP. Exercises were reviewed and Iam was able to demonstrate them prior to the end of the session.  Iam demonstrated good understanding of the education provided. See EMR under Patient Instructions for exercises provided during therapy sessions    Assessment     At Evaluation:  Iam is a 44 y.o. male referred to outpatient Physical Therapy with a medical diagnosis of tear of anterior talofibular ligament in right ankle. Patient presents with significant pain, swelling and loss of range of motion in right ankle. He is in a tall walking boot. He also has significant pain, swelling and decreased range of motion in right knee as well. He states the knee is the reason he can't put any weight on the right lower extremity despite being weight bearing as tolerated per MD orders. He says he is going to get the fluid drawn off later today. Reports h/o right knee surgery in 2012 for torn meniscus. He has an MRI confirmed tear of the anterior talofibular ligament in the right ankle. He has had no advanced imaging of the knee. He states his knee has done this before and drawing the fluid off helped so he is hopeful his knee symptoms will resolve. Iam is a  for [employer] so he has a labor intensive job to return to which will require extensive therapy.     Current  Assessment:  Iam arrived without crutches today. Range is limited by pain, but there is terminal knee extension weakness as well. No change since last treatment.    Iam Is progressing towards his goals.   Pt prognosis is Good.     Pt will continue to benefit from skilled outpatient physical therapy to address the deficits listed in the problem list box on initial evaluation, provide pt/family education and to maximize pt's level of independence in the home and community environment.     Pt's spiritual, cultural and educational needs considered and pt agreeable to plan of care and goals.     Anticipated barriers to physical therapy: none    Goals:   SHORT TERM GOALS  Patient to be independent with home exercise program to facilitate carryover between therapy visits.  Patient will have +5 degrees dorsiflexion, 15 degrees inversion and 10 degrees eversion range of motion right ankle for improved gait and functional mobility.  Patient will have 0-120 degrees range of motion right knee for improved gait and functional mobility.  Patient will increase manual muscle test of right ankle and knee to 4+ to 5/5 for increased stability with gait and activiites of daily living.     LONG TERM GOALS  Patient will go up/down stairs reciprocal pattern without handrails and up/down a ladder and with good eccentric control on right lower extremity.  Patient will ambulate independent without assistive device, without deviation and without pain in right knee or ankle.  Patient will be able to return to work full duty as a  for [employer].    Plan     Plan of care Certification: 11/7/2024 to 01/31/2025.     Outpatient Physical Therapy 3 times weekly for 4 weeks, then 2 times weekly for 6 weeks to include the following interventions: 13576 [therapeutic exercise], 09266 [neuromuscular re-education], 51762 [gait training], 85162 [manual therapy], 80984 [therapeutic activities], 38857 [unattended electrical  stimulation], 62173 [biofeedback by any modality], and 94786 [vasopneumatic device].     Negar Reece, THELMA   12/31/2024

## 2025-01-02 ENCOUNTER — CLINICAL SUPPORT (OUTPATIENT)
Dept: REHABILITATION | Facility: HOSPITAL | Age: 45
End: 2025-01-02
Payer: OTHER MISCELLANEOUS

## 2025-01-02 DIAGNOSIS — M25.661 DECREASED ROM OF RIGHT KNEE: ICD-10-CM

## 2025-01-02 DIAGNOSIS — S93.491D SPRAIN OF ANTERIOR TALOFIBULAR LIGAMENT OF RIGHT ANKLE, SUBSEQUENT ENCOUNTER: Primary | ICD-10-CM

## 2025-01-02 DIAGNOSIS — R26.2 DIFFICULTY WALKING: ICD-10-CM

## 2025-01-02 DIAGNOSIS — M25.671 STIFFNESS OF RIGHT ANKLE JOINT: ICD-10-CM

## 2025-01-02 DIAGNOSIS — M79.604 RIGHT LEG PAIN: ICD-10-CM

## 2025-01-02 PROCEDURE — 97110 THERAPEUTIC EXERCISES: CPT | Mod: CQ

## 2025-01-02 PROCEDURE — 97530 THERAPEUTIC ACTIVITIES: CPT | Mod: CQ

## 2025-01-02 PROCEDURE — 97112 NEUROMUSCULAR REEDUCATION: CPT | Mod: CQ

## 2025-01-02 NOTE — PROGRESS NOTES
[facility]   Physical Therapy Treatment Note      Name: Iam Morillo  Clinic Number: [id number]    Therapy Diagnosis: Sprain of anterior talofibular ligament of right ankle and ACL sprain    Physician: Yonatan Cross MD    Visit Date: 1/2/2025    Physician Orders: PT Eval and Treat   Medical Diagnosis from Referral: see above  Evaluation Date: 11/7/2024  Authorization Period Expiration: 10/29/2026  Plan of Care Expiration: 01/31/2025     Date of Surgery: n/a  Visit # / Visits authorized: 15/24   FOTO: 1/ 3 = 38    2/3 = 30     Precautions: Standard     PTA Visit #: 2/5     Time In: 11:25 am  Time Out: 12:10 am  Total Billable Time: 45 billable minutes     Subjective     Pt reports: He continue to have increased pain with increased walking.    He was compliant with home exercise program.  Response to previous treatment: no complaints   Functional change: no change noted    Pain: 2/10 at rest, 5-6/10 with movement    Location: right knee     Objective      0 degrees right knee resting extension on arrival, lags to -5 with straight leg raise, 105 degrees flexion  limited by pain    Treatment     Iam received the treatments listed below:      therapeutic exercises to develop strength, endurance, ROM, flexibility, posture, and core stabilization for 10 billable minutes including:  Bike x 5 minutes  Seated hamstring curls w/+  5 plates 3 x 10  Incline board quad sets x 2 min    (Not today)  Ideal stretch 10 x 10 second hold   Ankle circles - green x 20 each direction         manual therapy techniques: Joint mobilizations, Manual traction, Myofacial release, Soft tissue Mobilization, and Friction Massage were applied to the: ankle for 0 minutes, including:  Gentle stretching in all planes and light desensitization over anterior ankle  Patellar mobs     neuromuscular re-education activities to improve: Balance, Coordination, Kinesthetic Sense, Proprioception, and Posture for 15  billable minutes. The following activities were included:  Quad sets 10 x 10sh  Short arc quad x 15 - no strap today  Straight leg raise x 20 - no strap today    (not performed today)   LAQ x 5 min w/ NMES   MRE - 4 way ankle - 3sh   Short foot x 20    Seated wobble board plantarflexion/dorsiflexion x 30  Long arc quad 2 x 10 (only able to go from about 90-20)     therapeutic activities to improve functional performance for 20 minutes, including:  Closed chain terminal knee extension - ball on wall - 5sh x 20  Calf raises at rail x 30  Standing rocker board - plantarflexion/dorsiflexion x 30   Bilateral leg press - 8 plates 3 x 10 w/ heel rock for terminal knee extension (increase weight next visit)  Right leg press - 5 plates 3 x 10 w/ heel rock for terminal knee extension     (Not today)  Seated calf raise - 1 plate 3 x 10    gait training to improve functional mobility and safety for   minutes, including:  - patient attempting to ambulate without assistive device in clinic so showed him how to ambulate with unilateral crutch for safer gait. Explained he was keeping knee flexed without assistive device and hitting heelstrike when using one crutch which is more beneficial to him.     direct contact modalities after being cleared for contraindications:     supervised modalities after being cleared for contradictions:   IFC Electrical Stimulation:  Iam received IFC Electrical Stimulation for pain control applied to the right knee. Pt received stimulation at 50 % scan at a frequency of  for 0 minutes. Iam tolerated treatment well without any adverse effects.  Right knee and ankle with Cps- REFUSED SAID IT MADE PAIN WORSE LAST TIME    NMES Electrical Stimulation:  Iam received NMES Electrical Stimulation to elicit muscle contraction of the right quad. Pt received stimulation at a rate of 50 pps with symmetric current, ramp of 2 seconds with 10 second on time and 20 second off time for a total of 0  minutes. Patient tolerated treatment well without any adverse effects.     Vasopneumatic/Game Ready to right ankle for 0 minutes - medium compression - end of bed elevated - to decrease pain and swelling- REFUSED SAID IT MADE PAIN WORSE    biofeedback to isolate quad contraction, decrease cocontraction, and assist with neuromuscular reeducation for 0 minutes. Exercises performed with biofeedback Including: (not performed today)       Patient Education and Home Exercises       Education provided:   - review of home exercise program and current Plan of Care/rationale of treatment.    Written Home Exercises Provided: Patient instructed to cont prior HEP. Exercises were reviewed and Iam was able to demonstrate them prior to the end of the session.  Iam demonstrated good understanding of the education provided. See EMR under Patient Instructions for exercises provided during therapy sessions    Assessment     At Evaluation:  Iam is a 44 y.o. male referred to outpatient Physical Therapy with a medical diagnosis of tear of anterior talofibular ligament in right ankle. Patient presents with significant pain, swelling and loss of range of motion in right ankle. He is in a tall walking boot. He also has significant pain, swelling and decreased range of motion in right knee as well. He states the knee is the reason he can't put any weight on the right lower extremity despite being weight bearing as tolerated per MD orders. He says he is going to get the fluid drawn off later today. Reports h/o right knee surgery in 2012 for torn meniscus. He has an MRI confirmed tear of the anterior talofibular ligament in the right ankle. He has had no advanced imaging of the knee. He states his knee has done this before and drawing the fluid off helped so he is hopeful his knee symptoms will resolve. Iam is a  for [employer] so he has a labor intensive job to return to which will require extensive  therapy.     Current Assessment:  Iam continues to complain of the same amount of pain but feels his strength is improving. His straight leg raise was more wavy than pure lag today. He was able to push up on leg press with one leg on 8 plates - so increased weight from 4 to 5 plates today and plan to increase weight for bilateral next visit. Continue Plan of Care and address functional deficits as able.    Iam Is progressing towards his goals.   Pt prognosis is Good.     Pt will continue to benefit from skilled outpatient physical therapy to address the deficits listed in the problem list box on initial evaluation, provide pt/family education and to maximize pt's level of independence in the home and community environment.     Pt's spiritual, cultural and educational needs considered and pt agreeable to plan of care and goals.     Anticipated barriers to physical therapy: none    Goals:   SHORT TERM GOALS  Patient to be independent with home exercise program to facilitate carryover between therapy visits.  Patient will have +5 degrees dorsiflexion, 15 degrees inversion and 10 degrees eversion range of motion right ankle for improved gait and functional mobility.  Patient will have 0-120 degrees range of motion right knee for improved gait and functional mobility.  Patient will increase manual muscle test of right ankle and knee to 4+ to 5/5 for increased stability with gait and activiites of daily living.     LONG TERM GOALS  Patient will go up/down stairs reciprocal pattern without handrails and up/down a ladder and with good eccentric control on right lower extremity.  Patient will ambulate independent without assistive device, without deviation and without pain in right knee or ankle.  Patient will be able to return to work full duty as a  for [employer].    Plan     Plan of care Certification: 11/7/2024 to 01/31/2025.     Outpatient Physical Therapy 3 times weekly for 4 weeks, then 2  times weekly for 6 weeks to include the following interventions: 34938 [therapeutic exercise], 99940 [neuromuscular re-education], 64399 [gait training], 55488 [manual therapy], 20774 [therapeutic activities], 53132 [unattended electrical stimulation], 26920 [biofeedback by any modality], and 42652 [vasopneumatic device].     Matilda Garza, PTA   01/02/2025

## 2025-01-07 ENCOUNTER — CLINICAL SUPPORT (OUTPATIENT)
Dept: REHABILITATION | Facility: HOSPITAL | Age: 45
End: 2025-01-07
Payer: OTHER MISCELLANEOUS

## 2025-01-07 DIAGNOSIS — M25.671 STIFFNESS OF RIGHT ANKLE JOINT: ICD-10-CM

## 2025-01-07 DIAGNOSIS — R26.2 DIFFICULTY WALKING: ICD-10-CM

## 2025-01-07 DIAGNOSIS — M79.604 RIGHT LEG PAIN: ICD-10-CM

## 2025-01-07 DIAGNOSIS — M25.661 DECREASED ROM OF RIGHT KNEE: ICD-10-CM

## 2025-01-07 DIAGNOSIS — S93.491D SPRAIN OF ANTERIOR TALOFIBULAR LIGAMENT OF RIGHT ANKLE, SUBSEQUENT ENCOUNTER: Primary | ICD-10-CM

## 2025-01-07 PROCEDURE — 97112 NEUROMUSCULAR REEDUCATION: CPT | Mod: CQ

## 2025-01-07 PROCEDURE — 97110 THERAPEUTIC EXERCISES: CPT | Mod: CQ

## 2025-01-07 PROCEDURE — 97530 THERAPEUTIC ACTIVITIES: CPT | Mod: CQ

## 2025-01-07 NOTE — PROGRESS NOTES
[facility]   Physical Therapy Treatment Note      Name: Iam Morillo  Clinic Number: [id number]    Therapy Diagnosis: Sprain of anterior talofibular ligament of right ankle and ACL sprain    Physician: Yonatan Cross MD    Visit Date: 1/7/2025    Physician Orders: PT Eval and Treat   Medical Diagnosis from Referral: see above  Evaluation Date: 11/7/2024  Authorization Period Expiration: 10/29/2026  Plan of Care Expiration: 01/31/2025     Date of Surgery: n/a  Visit # / Visits authorized: 18/24   FOTO: 1/ 3 = 38    2/3 = 30     Precautions: Standard     PTA Visit #: 4/5     Time In: 10:02 am  Time Out: 10:58 am  Total Billable Time: 53 billable minutes     Subjective     Pt reports: no new complaints     He was compliant with home exercise program.  Response to previous treatment: no complaints   Functional change: no change noted    Pain: 2/10 at rest, 5-6/10 with movement    Location: right knee     Objective      0 degrees right knee resting extension on arrival, lags to -5 with straight leg raise, 105 degrees flexion  limited by pain  .ke    Treatment     Iam received the treatments listed below:      therapeutic exercises to develop strength, endurance, ROM, flexibility, posture, and core stabilization for 13 billable minutes including:  Bike x 5 minutes  Seated hamstring curls w/+  6 plates 3 x 10  Incline board quad sets x 2 minutes     manual therapy techniques: Joint mobilizations, Manual traction, Myofacial release, Soft tissue Mobilization, and Friction Massage were applied to the: ankle for 0 minutes, including:  Gentle stretching in all planes and light desensitization over anterior ankle  Patellar mobs     neuromuscular re-education activities to improve: Balance, Coordination, Kinesthetic Sense, Proprioception, and Posture for 15 billable minutes. The following activities were included:  Quad sets 10 x 10sh  Short arc quad x 30 - no strap today  Straight leg  raise x 20 - no strap today    therapeutic activities to improve functional performance for 25 minutes, including:  Closed chain terminal knee extension - ball on wall - 5sh x 30  Calf raises at rail x 30  Standing rocker board - plantarflexion/dorsiflexion x 30   Bilateral leg press - 10 plates 3 x 10 w/ heel rock for terminal knee extension   Right leg press - 6 plates 3 x 10 w/ heel rock for terminal knee extension     (Not today)  Seated calf raise - 1 plate 3 x 10    gait training to improve functional mobility and safety for   minutes, including:  - patient attempting to ambulate without assistive device in clinic so showed him how to ambulate with unilateral crutch for safer gait. Explained he was keeping knee flexed without assistive device and hitting heelstrike when using one crutch which is more beneficial to him.     direct contact modalities after being cleared for contraindications:     supervised modalities after being cleared for contradictions:   IFC Electrical Stimulation:  Iam received IFC Electrical Stimulation for pain control applied to the right knee. Pt received stimulation at 50 % scan at a frequency of  for 0 minutes. Iam tolerated treatment well without any adverse effects.  Right knee and ankle with Cps- REFUSED SAID IT MADE PAIN WORSE LAST TIME    NMES Electrical Stimulation:  Iam received NMES Electrical Stimulation to elicit muscle contraction of the right quad. Pt received stimulation at a rate of 50 pps with symmetric current, ramp of 2 seconds with 10 second on time and 20 second off time for a total of 0 minutes. Patient tolerated treatment well without any adverse effects.     Vasopneumatic/Game [employer] to right ankle for 0 minutes - medium compression - end of bed elevated - to decrease pain and swelling- REFUSED SAID IT MADE PAIN WORSE    biofeedback to isolate quad contraction, decrease cocontraction, and assist with neuromuscular reeducation for 0 minutes.  Exercises performed with biofeedback Including: (not performed today)       Patient Education and Home Exercises       Education provided:   - review of home exercise program and current Plan of Care/rationale of treatment.    Written Home Exercises Provided: Patient instructed to cont prior HEP. Exercises were reviewed and Iam was able to demonstrate them prior to the end of the session.  Iam demonstrated good understanding of the education provided. See EMR under Patient Instructions for exercises provided during therapy sessions    Assessment     At Evaluation:  Iam is a 44 y.o. male referred to outpatient Physical Therapy with a medical diagnosis of tear of anterior talofibular ligament in right ankle. Patient presents with significant pain, swelling and loss of range of motion in right ankle. He is in a tall walking boot. He also has significant pain, swelling and decreased range of motion in right knee as well. He states the knee is the reason he can't put any weight on the right lower extremity despite being weight bearing as tolerated per MD orders. He says he is going to get the fluid drawn off later today. Reports h/o right knee surgery in 2012 for torn meniscus. He has an MRI confirmed tear of the anterior talofibular ligament in the right ankle. He has had no advanced imaging of the knee. He states his knee has done this before and drawing the fluid off helped so he is hopeful his knee symptoms will resolve. Iam is a  for [employer] so he has a labor intensive job to return to which will require extensive therapy.     Current Assessment:  Iam reported some soreness in knee due to walking in the woods a lot to hunt the last 3 days. Pain is essentially the same but he feels stronger. Weight increased on leg press for bilateral from 8 to 10 plates. Right was increased from 5 to 6 plates. Mat exercises were performed to increase quad awareness in terminal knee  extension. Repetitions were increased on straight leg raises and short arc quads. Continue Plan of Care and address functional deficits as able.    Iam Is progressing towards his goals.   Pt prognosis is Good.     Pt will continue to benefit from skilled outpatient physical therapy to address the deficits listed in the problem list box on initial evaluation, provide pt/family education and to maximize pt's level of independence in the home and community environment.     Pt's spiritual, cultural and educational needs considered and pt agreeable to plan of care and goals.     Anticipated barriers to physical therapy: none    Goals:   SHORT TERM GOALS  Patient to be independent with home exercise program to facilitate carryover between therapy visits.  Patient will have +5 degrees dorsiflexion, 15 degrees inversion and 10 degrees eversion range of motion right ankle for improved gait and functional mobility.  Patient will have 0-120 degrees range of motion right knee for improved gait and functional mobility.  Patient will increase manual muscle test of right ankle and knee to 4+ to 5/5 for increased stability with gait and activiites of daily living.     LONG TERM GOALS  Patient will go up/down stairs reciprocal pattern without handrails and up/down a ladder and with good eccentric control on right lower extremity.  Patient will ambulate independent without assistive device, without deviation and without pain in right knee or ankle.  Patient will be able to return to work full duty as a  for [employer].    Plan     Plan of care Certification: 11/7/2024 to 01/31/2025.     Outpatient Physical Therapy 3 times weekly for 4 weeks, then 2 times weekly for 6 weeks to include the following interventions: 51216 [therapeutic exercise], 64319 [neuromuscular re-education], 80639 [gait training], 47811 [manual therapy], 51398 [therapeutic activities], 25373 [unattended electrical stimulation], 89052  [biofeedback by any modality], and 52453 [vasopneumatic device].     Matilda Garza, PTA   01/07/2025

## 2025-01-09 ENCOUNTER — CLINICAL SUPPORT (OUTPATIENT)
Dept: REHABILITATION | Facility: HOSPITAL | Age: 45
End: 2025-01-09
Payer: OTHER MISCELLANEOUS

## 2025-01-09 DIAGNOSIS — S93.491D SPRAIN OF ANTERIOR TALOFIBULAR LIGAMENT OF RIGHT ANKLE, SUBSEQUENT ENCOUNTER: Primary | ICD-10-CM

## 2025-01-09 DIAGNOSIS — M25.661 DECREASED ROM OF RIGHT KNEE: ICD-10-CM

## 2025-01-09 DIAGNOSIS — M25.671 STIFFNESS OF RIGHT ANKLE JOINT: ICD-10-CM

## 2025-01-09 DIAGNOSIS — M79.604 RIGHT LEG PAIN: ICD-10-CM

## 2025-01-09 DIAGNOSIS — R26.2 DIFFICULTY WALKING: ICD-10-CM

## 2025-01-09 PROCEDURE — 97530 THERAPEUTIC ACTIVITIES: CPT | Mod: CQ

## 2025-01-09 PROCEDURE — 97112 NEUROMUSCULAR REEDUCATION: CPT | Mod: CQ

## 2025-01-09 PROCEDURE — 97110 THERAPEUTIC EXERCISES: CPT | Mod: CQ

## 2025-01-09 NOTE — PROGRESS NOTES
[facility]   Physical Therapy Treatment Note      Name: Iam Morillo  Clinic Number: [id number]    Therapy Diagnosis: Sprain of anterior talofibular ligament of right ankle and ACL sprain    Physician: Yonatan Cross MD    Visit Date: 1/9/2025    Physician Orders: PT Eval and Treat   Medical Diagnosis from Referral: see above  Evaluation Date: 11/7/2024  Authorization Period Expiration: 10/29/2026  Plan of Care Expiration: 01/31/2025     Date of Surgery: n/a  Visit # / Visits authorized: 19/24   FOTO: 1 = 38    2 = 30    3 = 47     Precautions: Standard     PTA Visit #: 5/5     Time In: 10:05 am  Time Out: 10:49 am  Total Billable Time: 44 billable minutes     Subjective     Pt reports: no new complaints     He was compliant with home exercise program.  Response to previous treatment: no complaints   Functional change: no change noted    Pain: 2/10 at rest, 5-6/10 with movement    Location: right knee     Objective      0 degrees right knee resting extension on arrival, lags to -5 with straight leg raise, 105 degrees flexion  limited by pain    Treatment     Iam received the treatments listed below:      therapeutic exercises to develop strength, endurance, ROM, flexibility, posture, and core stabilization for 12 billable minutes including:  Bike x 5 minutes  Seated hamstring curls w/+  6 plates 3 x 10  Incline board quad sets x 2 minutes     manual therapy techniques: Joint mobilizations, Manual traction, Myofacial release, Soft tissue Mobilization, and Friction Massage were applied to the: ankle for 0 minutes, including:  Gentle stretching in all planes and light desensitization over anterior ankle  Patellar mobs     neuromuscular re-education activities to improve: Balance, Coordination, Kinesthetic Sense, Proprioception, and Posture for 9 billable minutes. The following activities were included:  Quad sets 10 x 10sh  Short arc quad x 30 - w/ strap today  Straight leg  "raise x 20 - no strap today    therapeutic activities to improve functional performance for 23 minutes, including:  Closed chain terminal knee extension - ball on wall - 5sh x 30  Calf raises at rail x 30  Standing rocker board - plantarflexion/dorsiflexion x 30   Bilateral leg press - 10 plates 3 x 10 w/ heel rock for terminal knee extension   Right leg press - 6 plates 3 x 10 w/ heel rock for terminal knee extension   Fwd step ups 4"  x 20  Lateral step downs 4" x 10       direct contact modalities after being cleared for contraindications:     supervised modalities after being cleared for contradictions:     NMES Electrical Stimulation:  Iam received NMES Electrical Stimulation to elicit muscle contraction of the right quad. Pt received stimulation at a rate of 50 pps with symmetric current, ramp of 2 seconds with 10 second on time and 20 second off time for a total of 0 minutes. Patient tolerated treatment well without any adverse effects.     biofeedback to isolate quad contraction, decrease cocontraction, and assist with neuromuscular reeducation for 0 minutes. Exercises performed with biofeedback Including: (not performed today)       Patient Education and Home Exercises       Education provided:   - review of home exercise program and current Plan of Care/rationale of treatment.    Written Home Exercises Provided: Patient instructed to cont prior HEP. Exercises were reviewed and Iam was able to demonstrate them prior to the end of the session.  Iam demonstrated good understanding of the education provided. See EMR under Patient Instructions for exercises provided during therapy sessions    Assessment     At Evaluation:  Iam is a 44 y.o. male referred to outpatient Physical Therapy with a medical diagnosis of tear of anterior talofibular ligament in right ankle. Patient presents with significant pain, swelling and loss of range of motion in right ankle. He is in a tall walking boot. He also " has significant pain, swelling and decreased range of motion in right knee as well. He states the knee is the reason he can't put any weight on the right lower extremity despite being weight bearing as tolerated per MD orders. He says he is going to get the fluid drawn off later today. Reports h/o right knee surgery in 2012 for torn meniscus. He has an MRI confirmed tear of the anterior talofibular ligament in the right ankle. He has had no advanced imaging of the knee. He states his knee has done this before and drawing the fluid off helped so he is hopeful his knee symptoms will resolve. Iam is a  for [employer] so he has a labor intensive job to return to which will require extensive therapy.     Current Assessment:  Iam completed an updated FOTO prior to beginning treatment this morning. His knee remains his main complaint at this time with his ankle doing much better. Added lateral step downs and fwd step ups with difficulty and muscle fatigue noted. Continue Plan of Care and address functional deficits as able.    Iam Is progressing towards his goals.   Pt prognosis is Good.     Pt will continue to benefit from skilled outpatient physical therapy to address the deficits listed in the problem list box on initial evaluation, provide pt/family education and to maximize pt's level of independence in the home and community environment.     Pt's spiritual, cultural and educational needs considered and pt agreeable to plan of care and goals.     Anticipated barriers to physical therapy: none    Goals:   SHORT TERM GOALS  Patient to be independent with home exercise program to facilitate carryover between therapy visits.  Patient will have +5 degrees dorsiflexion, 15 degrees inversion and 10 degrees eversion range of motion right ankle for improved gait and functional mobility.  Patient will have 0-120 degrees range of motion right knee for improved gait and functional  mobility.  Patient will increase manual muscle test of right ankle and knee to 4+ to 5/5 for increased stability with gait and activiites of daily living.     LONG TERM GOALS  Patient will go up/down stairs reciprocal pattern without handrails and up/down a ladder and with good eccentric control on right lower extremity.  Patient will ambulate independent without assistive device, without deviation and without pain in right knee or ankle.  Patient will be able to return to work full duty as a  for [employer].    Plan     Plan of care Certification: 11/7/2024 to 01/31/2025.     Outpatient Physical Therapy 3 times weekly for 4 weeks, then 2 times weekly for 6 weeks to include the following interventions: 16495 [therapeutic exercise], 04523 [neuromuscular re-education], 22732 [gait training], 11400 [manual therapy], 31244 [therapeutic activities], 74670 [unattended electrical stimulation], 90930 [biofeedback by any modality], and 35111 [vasopneumatic device].     Matilda Garza, PTA   01/09/2025

## 2025-01-14 ENCOUNTER — CLINICAL SUPPORT (OUTPATIENT)
Dept: REHABILITATION | Facility: HOSPITAL | Age: 45
End: 2025-01-14
Payer: OTHER MISCELLANEOUS

## 2025-01-14 DIAGNOSIS — M79.604 RIGHT LEG PAIN: ICD-10-CM

## 2025-01-14 DIAGNOSIS — M25.671 STIFFNESS OF RIGHT ANKLE JOINT: ICD-10-CM

## 2025-01-14 DIAGNOSIS — S93.491D SPRAIN OF ANTERIOR TALOFIBULAR LIGAMENT OF RIGHT ANKLE, SUBSEQUENT ENCOUNTER: Primary | ICD-10-CM

## 2025-01-14 DIAGNOSIS — R26.2 DIFFICULTY WALKING: ICD-10-CM

## 2025-01-14 DIAGNOSIS — M25.661 DECREASED ROM OF RIGHT KNEE: ICD-10-CM

## 2025-01-14 PROCEDURE — 97110 THERAPEUTIC EXERCISES: CPT

## 2025-01-14 PROCEDURE — 97530 THERAPEUTIC ACTIVITIES: CPT

## 2025-01-14 PROCEDURE — 97112 NEUROMUSCULAR REEDUCATION: CPT

## 2025-01-14 NOTE — PROGRESS NOTES
[facility]   Physical Therapy Treatment Note      Name: Iam Morillo  Clinic Number: [id number]    Therapy Diagnosis: Sprain of anterior talofibular ligament of right ankle and ACL sprain    Physician: Yonatan Cross MD    Visit Date: 1/14/2025    Physician Orders: PT Eval and Treat   Medical Diagnosis from Referral: see above  Evaluation Date: 11/7/2024  Authorization Period Expiration: 10/29/2026  Plan of Care Expiration: 01/31/2025     Date of Surgery: n/a  Visit # / Visits authorized: 20/24   FOTO: 1 = 38    2 = 30    3 = 47     Precautions: Standard     PTA Visit #: 0/5     Time In: 10:02 am  Time Out: 10:55 am  Total Billable Time: 53 billable minutes     Subjective     Pt reports: no new complaints     He was compliant with home exercise program.  Response to previous treatment: no complaints   Functional change: no change noted    Pain: 2/10 at rest, 5-6/10 with movement    Location: right knee     Objective      0 degrees right knee resting extension on arrival, lags to -5 with straight leg raise, 105 degrees flexion  limited by pain    Treatment     Iam received the treatments listed below:      therapeutic exercises to develop strength, endurance, ROM, flexibility, posture, and core stabilization for 15 billable minutes including:  Bike x 5 minutes  Seated hamstring curls w/+  6 plates 3 x 10  Incline board quad sets x 2 minutes   Ankle inversion - yellow x 25  Ankle eversion - yellow x 30    manual therapy techniques: Joint mobilizations, Manual traction, Myofacial release, Soft tissue Mobilization, and Friction Massage were applied to the: ankle for 0 minutes, including:  Gentle stretching in all planes and light desensitization over anterior ankle  Patellar mobs     neuromuscular re-education activities to improve: Balance, Coordination, Kinesthetic Sense, Proprioception, and Posture for 15 billable minutes. The following activities were included:  Quad sets 10  "x 10sh - in propped extension   Terminal knee extension - small gray bolster - 10 x 10sh  Straight leg raise x 20 - no strap today  Cybex knee extension - 2 plates 3 x 6    therapeutic activities to improve functional performance for 23 minutes, including:  Closed chain terminal knee extension - ball on wall - 10sh x 15  Calf raises off step 3 x 10  Standing rocker board - plantarflexion/dorsiflexion 3 x 10 - unilateral   Bilateral leg press - 10 plates 3 x 10 w/ heel rock for terminal knee extension   Right leg press - 6 plates 3 x 10 w/ heel rock for terminal knee extension   Lateral step downs 4" x 10 - still has pain with this and poor eccentric quad control    Fwd step ups 4"  x 20 - not today      direct contact modalities after being cleared for contraindications:     supervised modalities after being cleared for contradictions:     NMES Electrical Stimulation:  Iam received NMES Electrical Stimulation to elicit muscle contraction of the right quad. Pt received stimulation at a rate of 50 pps with symmetric current, ramp of 2 seconds with 10 second on time and 20 second off time for a total of 0 minutes. Patient tolerated treatment well without any adverse effects.     biofeedback to isolate quad contraction, decrease cocontraction, and assist with neuromuscular reeducation for 0 minutes. Exercises performed with biofeedback Including: (not performed today)       Patient Education and Home Exercises       Education provided:   - review of home exercise program and current Plan of Care/rationale of treatment.    Written Home Exercises Provided: Patient instructed to cont prior HEP. Exercises were reviewed and Iam was able to demonstrate them prior to the end of the session.  Iam demonstrated good understanding of the education provided. See EMR under Patient Instructions for exercises provided during therapy sessions    Assessment     At Evaluation:  Iam is a 44 y.o. male referred to outpatient " "Physical Therapy with a medical diagnosis of tear of anterior talofibular ligament in right ankle. Patient presents with significant pain, swelling and loss of range of motion in right ankle. He is in a tall walking boot. He also has significant pain, swelling and decreased range of motion in right knee as well. He states the knee is the reason he can't put any weight on the right lower extremity despite being weight bearing as tolerated per MD orders. He says he is going to get the fluid drawn off later today. Reports h/o right knee surgery in 2012 for torn meniscus. He has an MRI confirmed tear of the anterior talofibular ligament in the right ankle. He has had no advanced imaging of the knee. He states his knee has done this before and drawing the fluid off helped so he is hopeful his knee symptoms will resolve. Iam is a  for [employer] so he has a labor intensive job to return to which will require extensive therapy.     Current Assessment:  Iam arrived with no new complaints. He states he follows up with Dr. Cross on 1/29/2025. His knee remains his main complaint at this time with his ankle mainly hurting with resisted inversion. He does still have weakness in the ankle. He says the knee is still swollen but not quite as bad. He states the pain is a little better than initially but still has pain daily with prolonged walking, end range of flexion and end range of extension. Lateral step downs from 4" box are quite painful on the inside of the knee so we are only doing about 10 reps. Added Cybex knee extension today which was challenging and he did have a little pain. His pain with this was more along the patellar tendon and inferior pole of the patella. Per his MRI report he had partial thickness chondral defects on the weight bearing surface of both the medial and lateral femoral condyles as well as full thickness defect of the lateral facet of the patella which are most likely " causing his pain. The report documented the ACL injury as a grade 1 sprain so this is most likely not contributing as much to his pain. He is ready to return to work but cannot do so with his knee issues. Will continue Plan of Care focusing on quad and ankle strength and see how MD wishes to proceed at his next follow up.     Iam Is progressing towards his goals.   Pt prognosis is Good.     Pt will continue to benefit from skilled outpatient physical therapy to address the deficits listed in the problem list box on initial evaluation, provide pt/family education and to maximize pt's level of independence in the home and community environment.     Pt's spiritual, cultural and educational needs considered and pt agreeable to plan of care and goals.     Anticipated barriers to physical therapy: none    Goals:   SHORT TERM GOALS  Patient to be independent with home exercise program to facilitate carryover between therapy visits.  Patient will have +5 degrees dorsiflexion, 15 degrees inversion and 10 degrees eversion range of motion right ankle for improved gait and functional mobility.  Patient will have 0-120 degrees range of motion right knee for improved gait and functional mobility.  Patient will increase manual muscle test of right ankle and knee to 4+ to 5/5 for increased stability with gait and activiites of daily living.     LONG TERM GOALS  Patient will go up/down stairs reciprocal pattern without handrails and up/down a ladder and with good eccentric control on right lower extremity.  Patient will ambulate independent without assistive device, without deviation and without pain in right knee or ankle.  Patient will be able to return to work full duty as a  for [employer].    Plan     Plan of care Certification: 11/7/2024 to 01/31/2025.     Outpatient Physical Therapy 3 times weekly for 4 weeks, then 2 times weekly for 6 weeks to include the following interventions: 56220 [therapeutic  exercise], 42117 [neuromuscular re-education], 98517 [gait training], 00395 [manual therapy], 44598 [therapeutic activities], 41635 [unattended electrical stimulation], 09593 [biofeedback by any modality], and 56961 [vasopneumatic device].     TATE LUTHER, PT   01/14/2025

## 2025-01-16 ENCOUNTER — CLINICAL SUPPORT (OUTPATIENT)
Dept: REHABILITATION | Facility: HOSPITAL | Age: 45
End: 2025-01-16
Payer: OTHER MISCELLANEOUS

## 2025-01-16 DIAGNOSIS — M25.671 STIFFNESS OF RIGHT ANKLE JOINT: ICD-10-CM

## 2025-01-16 DIAGNOSIS — R26.2 DIFFICULTY WALKING: ICD-10-CM

## 2025-01-16 DIAGNOSIS — S93.491D SPRAIN OF ANTERIOR TALOFIBULAR LIGAMENT OF RIGHT ANKLE, SUBSEQUENT ENCOUNTER: Primary | ICD-10-CM

## 2025-01-16 DIAGNOSIS — M79.604 RIGHT LEG PAIN: ICD-10-CM

## 2025-01-16 DIAGNOSIS — M25.661 DECREASED ROM OF RIGHT KNEE: ICD-10-CM

## 2025-01-16 PROCEDURE — 97110 THERAPEUTIC EXERCISES: CPT | Mod: CQ

## 2025-01-16 PROCEDURE — 97112 NEUROMUSCULAR REEDUCATION: CPT | Mod: CQ

## 2025-01-16 PROCEDURE — 97530 THERAPEUTIC ACTIVITIES: CPT | Mod: CQ

## 2025-01-16 NOTE — PROGRESS NOTES
[facility]   Physical Therapy Treatment Note      Name: Iam Morillo  Clinic Number: [id number]    Therapy Diagnosis: Sprain of anterior talofibular ligament of right ankle and ACL sprain    Physician: Yonatan Cross MD    Visit Date: 1/16/2025    Physician Orders: PT Eval and Treat   Medical Diagnosis from Referral: see above  Evaluation Date: 11/7/2024  Authorization Period Expiration: 10/29/2026  Plan of Care Expiration: 01/31/2025     Date of Surgery: n/a  Visit # / Visits authorized: 21/24   FOTO: 1 = 38    2 = 30    3 = 47     Precautions: Standard     PTA Visit #: 1/5     Time In: 10:02 am  Time Out: 10:45 am  Total Billable Time: 43 billable minutes     Subjective     Pt reports: no new complaints     He was compliant with home exercise program.  Response to previous treatment: no complaints   Functional change: no change noted    Pain: 2/10 at rest, 5-6/10 with movement    Location: right knee     Objective      Not tested today 1/16/25  0 degrees right knee resting extension on arrival, lags to -5 with straight leg raise, 105 degrees flexion  limited by pain    Treatment     Iam received the treatments listed below:      therapeutic exercises to develop strength, endurance, ROM, flexibility, posture, and core stabilization for 11 billable minutes including:  Bike x 5 minutes  Seated hamstring curls w/+  6 plates 3 x 10  Incline board quad sets x 2 minutes   Ankle inversion - yellow   Ankle eversion - yellow     manual therapy techniques: Joint mobilizations, Manual traction, Myofacial release, Soft tissue Mobilization, and Friction Massage were applied to the: ankle for 0 minutes, including:  Gentle stretching in all planes and light desensitization over anterior ankle  Patellar mobs     neuromuscular re-education activities to improve: Balance, Coordination, Kinesthetic Sense, Proprioception, and Posture for 15 billable minutes. The following activities were  "included:  Quad sets 10 x 10sh - in propped extension   Terminal knee extension - small gray bolster - 10 x 10sh  Straight leg raise x 20 - no strap today  Cybex knee extension - 2 plates 3 x 6    therapeutic activities to improve functional performance for 17 minutes, including:  Closed chain terminal knee extension - ball on wall - 10sh x 15  Calf raises off step 3 x 10  Standing rocker board - plantarflexion/dorsiflexion 3 x 10 - unilateral   Bilateral leg press - 10 plates 3 x 10 w/ heel rock for terminal knee extension   Right leg press - 6 plates 3 x 10 w/ heel rock for terminal knee extension   Lateral step downs 4"  - still has pain with this and poor eccentric quad control  Fwd step ups 4"  x 20       direct contact modalities after being cleared for contraindications:     supervised modalities after being cleared for contradictions:     NMES Electrical Stimulation:  Iam received NMES Electrical Stimulation to elicit muscle contraction of the right quad. Pt received stimulation at a rate of 50 pps with symmetric current, ramp of 2 seconds with 10 second on time and 20 second off time for a total of 0 minutes. Patient tolerated treatment well without any adverse effects.     biofeedback to isolate quad contraction, decrease cocontraction, and assist with neuromuscular reeducation for 0 minutes. Exercises performed with biofeedback Including: (not performed today)       Patient Education and Home Exercises       Education provided:   - review of home exercise program and current Plan of Care/rationale of treatment.    Written Home Exercises Provided: Patient instructed to cont prior HEP. Exercises were reviewed and Iam was able to demonstrate them prior to the end of the session.  Iam demonstrated good understanding of the education provided. See EMR under Patient Instructions for exercises provided during therapy sessions    Assessment     At Evaluation:  Iam is a 44 y.o. male referred to " outpatient Physical Therapy with a medical diagnosis of tear of anterior talofibular ligament in right ankle. Patient presents with significant pain, swelling and loss of range of motion in right ankle. He is in a tall walking boot. He also has significant pain, swelling and decreased range of motion in right knee as well. He states the knee is the reason he can't put any weight on the right lower extremity despite being weight bearing as tolerated per MD orders. He says he is going to get the fluid drawn off later today. Reports h/o right knee surgery in 2012 for torn meniscus. He has an MRI confirmed tear of the anterior talofibular ligament in the right ankle. He has had no advanced imaging of the knee. He states his knee has done this before and drawing the fluid off helped so he is hopeful his knee symptoms will resolve. 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     Current Assessment:  Iam arrived with no new complaints. He states he follows up with Dr. Cross on 1/29/2025. His knee remains his main complaint at this time with his ankle mainly hurting with resisted inversion. Seated knee extension remains painful but he can perform 3 x 6. Performed step ups instead of step downs today. He is ready to return to work but cannot do so with his knee issues. Will continue Plan of Care focusing on quad and ankle strength and see how MD wishes to proceed at his next follow up.     Iam Is progressing towards his goals.   Pt prognosis is Good.     Pt will continue to benefit from skilled outpatient physical therapy to address the deficits listed in the problem list box on initial evaluation, provide pt/family education and to maximize pt's level of independence in the home and community environment.     Pt's spiritual, cultural and educational needs considered and pt agreeable to plan of care and goals.     Anticipated  barriers to physical therapy: none    Goals:   SHORT TERM GOALS  Patient to be independent with home exercise program to facilitate carryover between therapy visits.  Patient will have +5 degrees dorsiflexion, 15 degrees inversion and 10 degrees eversion range of motion right ankle for improved gait and functional mobility.  Patient will have 0-120 degrees range of motion right knee for improved gait and functional mobility.  Patient will increase manual muscle test of right ankle and knee to 4+ to 5/5 for increased stability with gait and activiites of daily living.     LONG TERM GOALS  Patient will go up/down stairs reciprocal pattern without handrails and up/down a ladder and with good eccentric control on right lower extremity.  Patient will ambulate independent without assistive device, without deviation and without pain in right knee or ankle.  Patient will be able to return to work full duty as a  for [employer].    Plan     Plan of care Certification: 11/7/2024 to 01/31/2025.     Outpatient Physical Therapy 3 times weekly for 4 weeks, then 2 times weekly for 6 weeks to include the following interventions: 61985 [therapeutic exercise], 20420 [neuromuscular re-education], 90277 [gait training], 25604 [manual therapy], 18729 [therapeutic activities], 22867 [unattended electrical stimulation], 28589 [biofeedback by any modality], and 36327 [vasopneumatic device].     Matilda Garza, THELMA   01/16/2025

## 2025-01-28 ENCOUNTER — TELEPHONE (OUTPATIENT)
Dept: ORTHOPEDICS | Facility: CLINIC | Age: 45
End: 2025-01-28
Payer: COMMERCIAL

## 2025-01-28 NOTE — LETTER
Fax Transmission                                                                                                                                                       Date: 2025       To:  Sophia @ [facility] From: Dr. Cross    Fax:  [phone] Fax: [phone]   Phone:   Phone: [phone]     Iam Morillo  : 1980                            CONFIDENTIALITY NOTICE:  The documents accompanying this telecopy transmission contain confidential information belonging to the sender. The information is intended only for the use of the individual or entity named above. If you are not the intended recipient you are hereby notified that any disclosure, copying, distribution or taking of any action in reliance on the contents of this telecopied information is strictly prohibited. If you have received this telecopy in error, please immediately notify this office by telephone at [phone].

## 2025-01-28 NOTE — TELEPHONE ENCOUNTER
----- Message from Dilip sent at 1/28/2025  1:26 PM CST -----  Regarding: Pt needing paperwork  Who Called: Sophia with [employer] is calling requesting update of treatment of this pt and what his work status is or when he can go back to work her fax is [phone]    Preferred Method of Contact: Phone Call  Patient's Preferred Phone Number on File: [phone]   Best Call Back Number, if different:  Additional Information:

## 2025-01-29 ENCOUNTER — OFFICE VISIT (OUTPATIENT)
Dept: ORTHOPEDICS | Facility: CLINIC | Age: 45
End: 2025-01-29
Payer: OTHER MISCELLANEOUS

## 2025-01-29 ENCOUNTER — HOSPITAL ENCOUNTER (OUTPATIENT)
Dept: RADIOLOGY | Facility: HOSPITAL | Age: 45
Discharge: HOME | End: 2025-01-29
Attending: ORTHOPAEDIC SURGERY
Payer: COMMERCIAL

## 2025-01-29 ENCOUNTER — CLINICAL SUPPORT (OUTPATIENT)
Dept: CARDIOLOGY | Facility: CLINIC | Age: 45
End: 2025-01-29
Payer: COMMERCIAL

## 2025-01-29 VITALS
SYSTOLIC BLOOD PRESSURE: 118 MMHG | BODY MASS INDEX: 30.7 KG/M2 | HEART RATE: 77 BPM | DIASTOLIC BLOOD PRESSURE: 82 MMHG | WEIGHT: 226.63 LBS | OXYGEN SATURATION: 96 % | HEIGHT: 72 IN

## 2025-01-29 DIAGNOSIS — Z01.810 PRE-OPERATIVE CARDIOVASCULAR EXAMINATION: ICD-10-CM

## 2025-01-29 DIAGNOSIS — S83.511A SPRAIN OF ANTERIOR CRUCIATE LIGAMENT OF RIGHT KNEE, INITIAL ENCOUNTER: Primary | ICD-10-CM

## 2025-01-29 DIAGNOSIS — S83.511A SPRAIN OF ANTERIOR CRUCIATE LIGAMENT OF RIGHT KNEE, INITIAL ENCOUNTER: ICD-10-CM

## 2025-01-29 DIAGNOSIS — S93.491A SPRAIN OF ANTERIOR TALOFIBULAR LIGAMENT OF RIGHT ANKLE, INITIAL ENCOUNTER: ICD-10-CM

## 2025-01-29 PROCEDURE — 99214 OFFICE O/P EST MOD 30 MIN: CPT | Mod: S$PBB,,, | Performed by: ORTHOPAEDIC SURGERY

## 2025-01-29 PROCEDURE — 93010 ELECTROCARDIOGRAM REPORT: CPT | Mod: S$PBB,,, | Performed by: HOSPITALIST

## 2025-01-29 PROCEDURE — 99999 PR PBB SHADOW E&M-EST. PATIENT-LVL III: CPT | Mod: PBBFAC,,, | Performed by: ORTHOPAEDIC SURGERY

## 2025-01-29 PROCEDURE — 99212 OFFICE O/P EST SF 10 MIN: CPT | Mod: PBBFAC,25

## 2025-01-29 PROCEDURE — 99213 OFFICE O/P EST LOW 20 MIN: CPT | Mod: PBBFAC | Performed by: ORTHOPAEDIC SURGERY

## 2025-01-29 PROCEDURE — 99999 PR PBB SHADOW E&M-EST. PATIENT-LVL II: CPT | Mod: PBBFAC,,,

## 2025-01-29 PROCEDURE — 93005 ELECTROCARDIOGRAM TRACING: CPT | Mod: PBBFAC | Performed by: HOSPITALIST

## 2025-01-29 NOTE — PATIENT INSTRUCTIONS
Your surgery is scheduled at [facility] in [place] for     Pre-Op Testin2025    _______ Lab (Clinic Lab 1st Floor)  _______ Chest X-ray ([facility] 1st Floor)  ___x____ EKG (Clinic 2nd Floor)    *[facility] will contact you the day before surgery to give you the arrival time.    *A  is required to provide transportation for you the day of surgery.    *Do NOT eat or drink anything after midnight the night before your surgery.    *Bring all medications in their original bottles.    *Bring anything you may need for an overnight stay.     *Bathe with Hibiclens the night or morning before surgery.    *The morning of your surgery ONLY take blood pressure medications, heart medications, medications for acid reflux, and thyroid medications (the morning dose only).  *Take these medications with a sip of water.    *Do not take Insulin or Diabetic Medications the night before or the morning of your surgery, unless directed otherwise.    *Be sure that you have stopped blood thinners at the appropriate time, as instructed.  (_____ days prior to surgery)    *Bring your C-Pap machine if you have one.    *All jewelry and piercings MUST be removed prior to surgery.    *False eye lashes must be removed prior to surgery.    *Any questions regarding co-pays or deductibles with insurance, please contact the [facility] at #[phone].    *For Financial Assistance you may call #[phone] or #[phone].    Thank you for choosing Dr. Yonatan Cross for your Orthopedic needs.  We look forward to caring for you. If you have any questions, please contact our office at [phone].

## 2025-01-29 NOTE — PROGRESS NOTES
Patient is here for his right knee partial ACL tear with a chondral injury to the patella.  At this time he is still having some pain over his inferior pole of the patella.  The shot helped bony still having some pain anteriorly.  His ankle he is still feeling instability.  He has an ATFL tear.  He is not responding to therapy to we discussed treatment options.  We are going to plan on doing a Brostrom Bolanos reconstruction of his ATFL on right ankle.  He is not ready to go back to work without any restrictions.  He is having to wear braces.  Risks and benefits of surgery were discussed.  Patient understands risks benefits wished to proceed with surgical intervention on right ankle.  He has pain then some instability on anterior drawer testing of the right ankle pain on inversion of the ankle.

## 2025-01-30 ENCOUNTER — PATIENT MESSAGE (OUTPATIENT)
Dept: REHABILITATION | Facility: HOSPITAL | Age: 45
End: 2025-01-30
Payer: COMMERCIAL

## 2025-01-31 ENCOUNTER — TELEPHONE (OUTPATIENT)
Dept: ORTHOPEDICS | Facility: CLINIC | Age: 45
End: 2025-01-31
Payer: COMMERCIAL

## 2025-01-31 NOTE — TELEPHONE ENCOUNTER
----- Message from Dilip sent at 1/31/2025 10:09 AM CST -----  Regarding: Nurse with info  Nurse called and said dr mcleod is working on the review for the pt to have right ankle surgery. Her call back number is [phone]

## 2025-02-10 NOTE — TELEPHONE ENCOUNTER
Dr. Cross did a Peer to Peer for patient's surgery.  They physician stated he would recommend the surgery be approved.

## 2025-02-17 PROBLEM — S93.409A: Status: ACTIVE | Noted: 2025-02-17

## 2025-02-17 NOTE — H&P (VIEW-ONLY)
Department of Orthopedic Surgery    History and Physical       Principal Problem: Sprain of anterior cruciate ligament of right knee, initial encounter [S82.163H]           HISTORY:  44-year-old male with a right ankle tear of his ATFL needing Brostrom Bolanos reconstruction of his ligaments on the lateral aspect of his right ankle    PAST MEDICAL HISTORY:   Past Medical History:   Diagnosis Date    Gout         PAST SURGICAL HISTORY: History reviewed. No pertinent surgical history.       ALLERGIES: Review of patient's allergies indicates:  No Known Allergies       MEDICATIONS: Prescriptions Prior to Admission[1]     SOCIAL HISTORY: Social History[2]       FAMILY HISTORY: No family history on file.       PHYSICAL EXAM:   Vitals:    01/29/25 1031   BP: 118/82   Pulse: 77     Body mass index is 30.73 kg/m².     In general, this is a well-developed, well-nourished male . The patient is alert, oriented and cooperative.      HEENT:  Normocephalic, atraumatic.  Extraocular movements are intact bilaterally.  The oropharynx is benign.       NECK:  Nontender with good range of motion.      LUNGS:  Clear to auscultation bilaterally.      HEART:  Demonstrates a regular rate and rhythm.  No murmurs appreciated.      ABDOMEN:  Soft, non-tender, non-distended.        EXTREMITIES:  Right lower extremity moves his toes has sensation to touch has palpable pulses.  He is tender palpation of the tip of the fibula.  He has pain on inversion of the ankle.  Some laxity on anterior drawer testing.      RADIOGRAPHIC FINDINGS:  X-rays show no fracture subluxation of the right ankle.  MRI shows complete rupture of the ATFL of the right ankle      IMPRESSION:  Rupture of the ATFL of the right ankle with instability      PLAN:  Brostrom Bolanos ligament reconstruction right ankle    I had a long discussion with the patient about treatment options, including operative and nonoperative treatments. We discussed pros and cons of each including  risks pertinent to surgery including pain, infection, bleeding, damage to adjacent structures like nerves and blood vessels, failure to heal, need for future surgeries, stiffness, instability, loss of limb, anesthesia risks like stroke, blood clot, loss of life. We discussed the possibility of need for later hardware removal in the case that hardware was used. We discussed common and uncommon risks, and discussed patient specific factors that may increase the risks present with surgery. All questions were answered. The patient expressed understanding of the pros and cons of surgery and wanted to proceed with surgical treatment.        (Subject to voice recognition error, transcription service not allowed)           [1] (Not in a hospital admission)  [2]   Social History  Socioeconomic History    Marital status:    Tobacco Use    Smoking status: Every Day    Smokeless tobacco: Current     Types: Snuff   Substance and Sexual Activity    Alcohol use: Yes     Comment: rarely    Drug use: Never

## 2025-02-18 ENCOUNTER — HOSPITAL ENCOUNTER (OUTPATIENT)
Facility: HOSPITAL | Age: 45
Discharge: HOME | End: 2025-02-18
Attending: ORTHOPAEDIC SURGERY | Admitting: ORTHOPAEDIC SURGERY
Payer: OTHER MISCELLANEOUS

## 2025-02-18 ENCOUNTER — APPOINTMENT (OUTPATIENT)
Dept: RADIOLOGY | Facility: HOSPITAL | Age: 45
End: 2025-02-18
Attending: ORTHOPAEDIC SURGERY
Payer: COMMERCIAL

## 2025-02-18 ENCOUNTER — ANESTHESIA EVENT (OUTPATIENT)
Dept: SURGERY | Facility: HOSPITAL | Age: 45
End: 2025-02-18
Payer: OTHER MISCELLANEOUS

## 2025-02-18 ENCOUNTER — ANESTHESIA (OUTPATIENT)
Dept: SURGERY | Facility: HOSPITAL | Age: 45
End: 2025-02-18
Payer: OTHER MISCELLANEOUS

## 2025-02-18 VITALS
WEIGHT: 220 LBS | DIASTOLIC BLOOD PRESSURE: 81 MMHG | BODY MASS INDEX: 29.8 KG/M2 | OXYGEN SATURATION: 95 % | TEMPERATURE: 98 F | SYSTOLIC BLOOD PRESSURE: 116 MMHG | HEART RATE: 69 BPM | HEIGHT: 72 IN | RESPIRATION RATE: 12 BRPM

## 2025-02-18 DIAGNOSIS — S93.409A COMPLETE TEAR OF LIGAMENT OF ANKLE: ICD-10-CM

## 2025-02-18 PROCEDURE — 27000510 HC BLANKET BAIR HUGGER ANY SIZE: Performed by: ANESTHESIOLOGY

## 2025-02-18 PROCEDURE — 63600175 PHARM REV CODE 636 W HCPCS: Mod: JZ,TB

## 2025-02-18 PROCEDURE — 25000003 PHARM REV CODE 250

## 2025-02-18 PROCEDURE — 36000708 HC OR TIME LEV III 1ST 15 MIN: Performed by: ORTHOPAEDIC SURGERY

## 2025-02-18 PROCEDURE — 97161 PT EVAL LOW COMPLEX 20 MIN: CPT

## 2025-02-18 PROCEDURE — 27000716 HC OXISENSOR PROBE, ANY SIZE: Performed by: ANESTHESIOLOGY

## 2025-02-18 PROCEDURE — 71000015 HC POSTOP RECOV 1ST HR: Performed by: ORTHOPAEDIC SURGERY

## 2025-02-18 PROCEDURE — C1713 ANCHOR/SCREW BN/BN,TIS/BN: HCPCS | Performed by: ORTHOPAEDIC SURGERY

## 2025-02-18 PROCEDURE — 27000655: Performed by: ANESTHESIOLOGY

## 2025-02-18 PROCEDURE — 73600 X-RAY EXAM OF ANKLE: CPT | Mod: TC,RT

## 2025-02-18 PROCEDURE — 27000177 HC AIRWAY, LARYNGEAL MASK: Performed by: ANESTHESIOLOGY

## 2025-02-18 PROCEDURE — 27201423 OPTIME MED/SURG SUP & DEVICES STERILE SUPPLY: Performed by: ORTHOPAEDIC SURGERY

## 2025-02-18 PROCEDURE — 37000009 HC ANESTHESIA EA ADD 15 MINS: Performed by: ORTHOPAEDIC SURGERY

## 2025-02-18 PROCEDURE — 71000033 HC RECOVERY, INTIAL HOUR: Performed by: ORTHOPAEDIC SURGERY

## 2025-02-18 PROCEDURE — 63600175 PHARM REV CODE 636 W HCPCS: Mod: JZ,TB | Performed by: ANESTHESIOLOGY

## 2025-02-18 PROCEDURE — 36000709 HC OR TIME LEV III EA ADD 15 MIN: Performed by: ORTHOPAEDIC SURGERY

## 2025-02-18 PROCEDURE — 25000003 PHARM REV CODE 250: Performed by: ORTHOPAEDIC SURGERY

## 2025-02-18 PROCEDURE — 37000008 HC ANESTHESIA 1ST 15 MINUTES: Performed by: ORTHOPAEDIC SURGERY

## 2025-02-18 DEVICE — LNT IMPLANT SYSTEM, 3.9 BC SWIVELOCK
Type: IMPLANTABLE DEVICE | Site: ANKLE | Status: FUNCTIONAL
Brand: ARTHREX®

## 2025-02-18 DEVICE — DX KNOTLESS FIBERTAK IMPLANT KIT
Type: IMPLANTABLE DEVICE | Site: ANKLE | Status: FUNCTIONAL
Brand: ARTHREX®

## 2025-02-18 RX ORDER — ONDANSETRON 4 MG/1
8 TABLET, ORALLY DISINTEGRATING ORAL EVERY 8 HOURS PRN
Status: DISCONTINUED | OUTPATIENT
Start: 2025-02-18 | End: 2025-02-18 | Stop reason: HOSPADM

## 2025-02-18 RX ORDER — HYDROMORPHONE HYDROCHLORIDE 2 MG/ML
0.5 INJECTION, SOLUTION INTRAMUSCULAR; INTRAVENOUS; SUBCUTANEOUS EVERY 5 MIN PRN
Status: DISCONTINUED | OUTPATIENT
Start: 2025-02-18 | End: 2025-02-18 | Stop reason: HOSPADM

## 2025-02-18 RX ORDER — OXYCODONE HYDROCHLORIDE 5 MG/1
5 TABLET ORAL
Status: DISCONTINUED | OUTPATIENT
Start: 2025-02-18 | End: 2025-02-18 | Stop reason: HOSPADM

## 2025-02-18 RX ORDER — HYDROCODONE BITARTRATE AND ACETAMINOPHEN 5; 325 MG/1; MG/1
1 TABLET ORAL EVERY 4 HOURS PRN
Status: DISCONTINUED | OUTPATIENT
Start: 2025-02-18 | End: 2025-02-18 | Stop reason: HOSPADM

## 2025-02-18 RX ORDER — DEXMEDETOMIDINE HYDROCHLORIDE 100 UG/ML
INJECTION, SOLUTION INTRAVENOUS
Status: DISCONTINUED | OUTPATIENT
Start: 2025-02-18 | End: 2025-02-18

## 2025-02-18 RX ORDER — CEFAZOLIN 2 G/1
2 INJECTION, POWDER, FOR SOLUTION INTRAMUSCULAR; INTRAVENOUS
Status: DISCONTINUED | OUTPATIENT
Start: 2025-02-18 | End: 2025-02-18 | Stop reason: HOSPADM

## 2025-02-18 RX ORDER — MIDAZOLAM HYDROCHLORIDE 1 MG/ML
INJECTION INTRAMUSCULAR; INTRAVENOUS
Status: DISCONTINUED | OUTPATIENT
Start: 2025-02-18 | End: 2025-02-18

## 2025-02-18 RX ORDER — HYDROCODONE BITARTRATE AND ACETAMINOPHEN 10; 325 MG/1; MG/1
1 TABLET ORAL EVERY 6 HOURS PRN
Qty: 28 TABLET | Refills: 0 | Status: SHIPPED | OUTPATIENT
Start: 2025-02-18

## 2025-02-18 RX ORDER — HYDROCODONE BITARTRATE AND ACETAMINOPHEN 10; 325 MG/1; MG/1
1 TABLET ORAL EVERY 4 HOURS PRN
Status: DISCONTINUED | OUTPATIENT
Start: 2025-02-18 | End: 2025-02-18 | Stop reason: HOSPADM

## 2025-02-18 RX ORDER — FENTANYL CITRATE 50 UG/ML
INJECTION, SOLUTION INTRAMUSCULAR; INTRAVENOUS
Status: DISCONTINUED | OUTPATIENT
Start: 2025-02-18 | End: 2025-02-18

## 2025-02-18 RX ORDER — HYDROMORPHONE HYDROCHLORIDE 2 MG/ML
INJECTION, SOLUTION INTRAMUSCULAR; INTRAVENOUS; SUBCUTANEOUS
Status: DISCONTINUED | OUTPATIENT
Start: 2025-02-18 | End: 2025-02-18

## 2025-02-18 RX ORDER — DEXAMETHASONE SODIUM PHOSPHATE 4 MG/ML
INJECTION, SOLUTION INTRA-ARTICULAR; INTRALESIONAL; INTRAMUSCULAR; INTRAVENOUS; SOFT TISSUE
Status: DISCONTINUED | OUTPATIENT
Start: 2025-02-18 | End: 2025-02-18

## 2025-02-18 RX ORDER — ONDANSETRON HYDROCHLORIDE 2 MG/ML
4 INJECTION, SOLUTION INTRAVENOUS DAILY PRN
Status: DISCONTINUED | OUTPATIENT
Start: 2025-02-18 | End: 2025-02-18 | Stop reason: HOSPADM

## 2025-02-18 RX ORDER — ACETAMINOPHEN 500 MG
1000 TABLET ORAL EVERY 6 HOURS PRN
Status: DISCONTINUED | OUTPATIENT
Start: 2025-02-18 | End: 2025-02-18 | Stop reason: HOSPADM

## 2025-02-18 RX ORDER — PROMETHAZINE HYDROCHLORIDE 25 MG/1
25 TABLET ORAL EVERY 6 HOURS PRN
Status: DISCONTINUED | OUTPATIENT
Start: 2025-02-18 | End: 2025-02-18 | Stop reason: HOSPADM

## 2025-02-18 RX ORDER — SODIUM CHLORIDE 9 MG/ML
INJECTION, SOLUTION INTRAVENOUS CONTINUOUS
Status: DISCONTINUED | OUTPATIENT
Start: 2025-02-18 | End: 2025-02-18 | Stop reason: HOSPADM

## 2025-02-18 RX ORDER — ACETAMINOPHEN 10 MG/ML
INJECTION, SOLUTION INTRAVENOUS
Status: DISCONTINUED | OUTPATIENT
Start: 2025-02-18 | End: 2025-02-18

## 2025-02-18 RX ORDER — DIPHENHYDRAMINE HYDROCHLORIDE 50 MG/ML
25 INJECTION INTRAMUSCULAR; INTRAVENOUS EVERY 6 HOURS PRN
Status: DISCONTINUED | OUTPATIENT
Start: 2025-02-18 | End: 2025-02-18 | Stop reason: HOSPADM

## 2025-02-18 RX ORDER — MORPHINE SULFATE 10 MG/ML
4 INJECTION INTRAMUSCULAR; INTRAVENOUS; SUBCUTANEOUS EVERY 5 MIN PRN
Status: DISCONTINUED | OUTPATIENT
Start: 2025-02-18 | End: 2025-02-18 | Stop reason: HOSPADM

## 2025-02-18 RX ORDER — SODIUM CHLORIDE, SODIUM LACTATE, POTASSIUM CHLORIDE, CALCIUM CHLORIDE 600; 310; 30; 20 MG/100ML; MG/100ML; MG/100ML; MG/100ML
125 INJECTION, SOLUTION INTRAVENOUS CONTINUOUS
Status: DISCONTINUED | OUTPATIENT
Start: 2025-02-18 | End: 2025-02-18 | Stop reason: HOSPADM

## 2025-02-18 RX ORDER — KETOROLAC TROMETHAMINE 30 MG/ML
INJECTION, SOLUTION INTRAMUSCULAR; INTRAVENOUS
Status: DISCONTINUED | OUTPATIENT
Start: 2025-02-18 | End: 2025-02-18

## 2025-02-18 RX ORDER — IPRATROPIUM BROMIDE AND ALBUTEROL SULFATE 2.5; .5 MG/3ML; MG/3ML
3 SOLUTION RESPIRATORY (INHALATION)
Status: DISCONTINUED | OUTPATIENT
Start: 2025-02-18 | End: 2025-02-18 | Stop reason: HOSPADM

## 2025-02-18 RX ORDER — PROPOFOL 10 MG/ML
INJECTION, EMULSION INTRAVENOUS
Status: DISCONTINUED | OUTPATIENT
Start: 2025-02-18 | End: 2025-02-18

## 2025-02-18 RX ORDER — LIDOCAINE HYDROCHLORIDE 20 MG/ML
INJECTION, SOLUTION EPIDURAL; INFILTRATION; INTRACAUDAL; PERINEURAL
Status: DISCONTINUED | OUTPATIENT
Start: 2025-02-18 | End: 2025-02-18

## 2025-02-18 RX ORDER — ONDANSETRON HYDROCHLORIDE 2 MG/ML
INJECTION, SOLUTION INTRAVENOUS
Status: DISCONTINUED | OUTPATIENT
Start: 2025-02-18 | End: 2025-02-18

## 2025-02-18 RX ADMIN — LIDOCAINE HYDROCHLORIDE 100 MG: 20 INJECTION, SOLUTION INTRAVENOUS at 10:02

## 2025-02-18 RX ADMIN — ONDANSETRON 4 MG: 2 INJECTION INTRAMUSCULAR; INTRAVENOUS at 10:02

## 2025-02-18 RX ADMIN — PROPOFOL 50 MG: 10 INJECTION, EMULSION INTRAVENOUS at 10:02

## 2025-02-18 RX ADMIN — FENTANYL CITRATE 25 MCG: 50 INJECTION, SOLUTION INTRAMUSCULAR; INTRAVENOUS at 10:02

## 2025-02-18 RX ADMIN — SODIUM CHLORIDE: 9 INJECTION, SOLUTION INTRAVENOUS at 10:02

## 2025-02-18 RX ADMIN — DEXMEDETOMIDINE HYDROCHLORIDE 8 MCG: 100 INJECTION, SOLUTION, CONCENTRATE INTRAVENOUS at 11:02

## 2025-02-18 RX ADMIN — ACETAMINOPHEN 1000 MG: 10 INJECTION, SOLUTION INTRAVENOUS at 11:02

## 2025-02-18 RX ADMIN — HYDROMORPHONE HYDROCHLORIDE 0.5 MG: 2 INJECTION INTRAMUSCULAR; INTRAVENOUS; SUBCUTANEOUS at 12:02

## 2025-02-18 RX ADMIN — PROPOFOL 150 MG: 10 INJECTION, EMULSION INTRAVENOUS at 10:02

## 2025-02-18 RX ADMIN — MIDAZOLAM HYDROCHLORIDE 2 MG: 1 INJECTION, SOLUTION INTRAMUSCULAR; INTRAVENOUS at 10:02

## 2025-02-18 RX ADMIN — HYDROMORPHONE HYDROCHLORIDE 0.5 MG: 2 INJECTION, SOLUTION INTRAMUSCULAR; INTRAVENOUS; SUBCUTANEOUS at 10:02

## 2025-02-18 RX ADMIN — DEXAMETHASONE SODIUM PHOSPHATE 8 MG: 4 INJECTION, SOLUTION INTRA-ARTICULAR; INTRALESIONAL; INTRAMUSCULAR; INTRAVENOUS; SOFT TISSUE at 10:02

## 2025-02-18 RX ADMIN — FENTANYL CITRATE 50 MCG: 50 INJECTION, SOLUTION INTRAMUSCULAR; INTRAVENOUS at 10:02

## 2025-02-18 RX ADMIN — KETOROLAC TROMETHAMINE 30 MG: 30 INJECTION, SOLUTION INTRAMUSCULAR at 10:02

## 2025-02-18 NOTE — TRANSFER OF CARE
Anesthesia Transfer of Care Note    Patient: Iam Morillo    Procedure(s) Performed: Procedure(s) (LRB):  REPAIR, LIGAMENT, COLLATERAL, ANKLE, SECONDARY (Right)    Patient location: PACU    Anesthesia Type: general    Transport from OR: Transported from OR on 2-3 L/min O2 by NC with adequate spontaneous ventilation    Post pain: adequate analgesia    Post assessment: no apparent anesthetic complications    Post vital signs: stable    Level of consciousness: responds to stimulation    Nausea/Vomiting: no nausea/vomiting    Complications: none    Transfer of care protocol was followed      Last vitals: Visit Vitals  /66 (BP Location: Left arm, Patient Position: Lying)   Pulse 82   Temp 36.5 °C (97.7 °F) (Oral)   Resp 14   Ht 6' (1.829 m)   Wt 99.8 kg (220 lb)   SpO2 95%   BMI 29.84 kg/m²

## 2025-02-18 NOTE — BRIEF OP NOTE
[facility] - Orthopedic Periop Services  Brief Operative Note    Surgery Date: 2/18/2025     Surgeons and Role:     * Yonatan Cross MD - Primary    Assisting Surgeon: None    Pre-op Diagnosis:  Sprain of anterior talofibular ligament of right ankle, initial encounter [S93.491A]    Post-op Diagnosis:  Post-Op Diagnosis Codes:     * Sprain of anterior talofibular ligament of right ankle, initial encounter [S93.491A]    Procedure(s) (LRB):  REPAIR, LIGAMENT, COLLATERAL, ANKLE, SECONDARY (Right)    Anesthesia: General    Operative Findings:  Patient underwent a Brostrom Bolanos procedure on in his right ankle without complication.    Estimated Blood Loss: 10 mL         Specimens:   Specimen (24h ago, onward)      None              Discharge Note    OUTCOME: Patient tolerated treatment/procedure well without complication and is now ready for discharge.    DISPOSITION: Home or Self Care    FINAL DIAGNOSIS:  Complete tear of ligament of ankle    FOLLOWUP: In clinic    DISCHARGE INSTRUCTIONS:    Discharge Procedure Orders   CRUTCHES FOR HOME USE     Order Specific Question Answer Comments   Type: Axillary    Height: 6'    Weight: 103    Length of need (1-99 months): 2      Diet general     Keep surgical extremity elevated     Ice to affected area   Order Comments: using barrier between ice and skin (specify duration&frequency)     Remove dressing in 72 hours   Order Comments: Keep dressing in place for 72 hours     Change dressing (specify)   Order Comments: Dressing change: one time per day beginning 72 hours post op.     Call MD for:  temperature >100.4     Call MD for:  persistent nausea and vomiting     Call MD for:  severe uncontrolled pain     Call MD for:  difficulty breathing, headache or visual disturbances     Call MD for:  redness, tenderness, or signs of infection (pain, swelling, redness, odor or green/yellow discharge around incision site)     Call MD for:  hives     Call MD for:  persistent dizziness  or light-headedness     Call MD for:  extreme fatigue     Activity as tolerated     Shower on day dressing removed (No bath)     Weight bearing as tolerated

## 2025-02-18 NOTE — OP NOTE
[facility] - Orthopedic Periop Services  General Surgery  Operative Note    SUMMARY     Date of Procedure: 2/18/2025     Procedure: Procedure(s) (LRB):  REPAIR, LIGAMENT, COLLATERAL, ANKLE, SECONDARY (Right)       Surgeons and Role:     * Yonatan Cross MD - Primary    Assisting Surgeon: None    Pre-Operative Diagnosis: Sprain of anterior talofibular ligament of right ankle, initial encounter [S93.491A]    Post-Operative Diagnosis: Post-Op Diagnosis Codes:     * Sprain of anterior talofibular ligament of right ankle, initial encounter [S93.491A]    Anesthesia: General    Operative Findings (including complications, if any):  After having risks and benefits of procedure explained at length the patient stating understands risks and benefits written informed consent was obtained patient was taken operating room placed on operative table anesthesia induced per Anesthesia.  In his right lower extremity then had a tourniquet placed over cast padding right lower extremity was then prepped and draped sterile fashion.  Leg was elevated exsanguinated Esmarch bandage was inflated 250 mm Hg for 24 minutes.  At this time a curved incision going just over the posterior border of the fibula slightly above the joint line to just passive tip of the fibula aiming toward the peroneal tendons a skin incision was made with a 15. Blade proximally 4 cm in length.  Careful dissection down to the lateral aspect of the fibula and overlying the retinaculum and the ATFL that has tissues were dissected.  Hemostasis maintained Bovie electrocautery.  At this time the capsule and the retinaculum overlying the tip of the fibula was released sharply and the ATFL and calcaneofibular ligament were identified.  There was noted to be disruption of the ATFL.  Laxity was noted prior to the incision.  At this time with the tip of the fibula exposed.  Three fiber tacks were placed into the tip of the fibula going from the posterior edge to the  central and a into the slightly anterior aspect of the distal fibula the anchors were placed in his sutures were then placed through the calcaneofibular ligament the ATFL in the soft tissues and capsule to repair of the ATFL and CFL.  At this time sutures were passed in mattress fashion and then passed through the repair suture through the knotless anchors and the repair of the ATFL and the CFL were performed.  Once this was performed sutures were then placed in his suture anchor proximally 2 cm proximal to the distal anchors in his central portion of the fibula and secured with a 2.9 SwiveLock anchor.  At this time the wound been irrigated out with copious amounts normal saline.  No laxity was noted.  Subcuticular 2-0 Vicryl followed by running 3-0 nylon skin the tourniquet was let down prior to wound closure hemostasis maintained Bovie electrocautery all counts correct no complications patient was awakened taken recovery room in good condition after posterior splint been placed.    Description of Technical Procedures:     Significant Surgical Tasks Conducted by the Assistant(s), if Applicable:     Estimated Blood Loss (EBL): 10 mL           Implants:   Implant Name Type Inv. Item Serial No.  Lot No. LRB No. Used Action   [employer] DX KNOTLESS FIBERTAK INSTABILITY IMPLANT KIT     [id number] Right 2 Implanted   SYS IMP SWVLOK 3.9 BC - [id number]  SYS IMP SWVLOK 3.9 BC  [employer] [id number] Right 1 Implanted       Specimens:   Specimen (24h ago, onward)      None                    Condition: Good    Disposition: PACU - hemodynamically stable.    Attestation: I was present and scrubbed for the entire procedure.

## 2025-02-18 NOTE — PROGRESS NOTES
1131 RECEIVED TO RR EASILY AROUSE.D O2 VIA NC. NO RESP. DISTRESS NOTED. DRESSING RIGHT LOWER LEG D/I, TOES EXPOSED, PINK, WARM, MOBILE, STRONG PEDAL PULSE. RIGHT LEG ELEVATED ON PILLOW. IV INFUSING WELL RIGHT WRIST 20G. CATH. NO DISTRESS NOTED. SEE FLOW SHEET.    1200 C/O PAIN AT OP-SITE 9/10, DILAUDID TITRATED FOR RELIEF.    1215 DOZING AT INTERVALS. O2 APPLIED VIA NC, RESP. SLOW, DEEP, 12 B/M. NO FURTHER NARCOTICS GIVEN TRANSFERRED TO ROOM.

## 2025-02-18 NOTE — INTERVAL H&P NOTE
The patient has been examined and the H&P has been reviewed:    I concur with the findings and no changes have occurred since H&P was written.    Surgery risks, benefits and alternative options discussed and understood by patient/family.          Active Hospital Problems    Diagnosis  POA    *Complete tear of ligament of ankle [S90.409A]  Unknown      Resolved Hospital Problems   No resolved problems to display.

## 2025-02-18 NOTE — ANESTHESIA PROCEDURE NOTES
LMA    Date/Time: 2/18/2025 10:15 AM    Performed by: Mukesh Lemus II, CRNA  Authorized by: Qamar Manrique MD    Intubation:     Induction:  Intravenous    Intubated:  Postinduction    Mask Ventilation:  Easy mask    Attempts:  1    Attempted By:  CRNA    Difficult Airway Encountered?: No      Complications:  None    Airway Device:  Supraglottic airway/LMA    Airway Device Size:  5.0    Style/Cuff Inflation:  Uncuffed (I-GEL)    Secured at:  The lips    Placement Verified By:  Capnometry    Complicating Factors:  None    Findings Post-Intubation:  BS equal bilateral and atraumatic/condition of teeth unchanged

## 2025-02-18 NOTE — ANESTHESIA PREPROCEDURE EVALUATION
02/18/2025  Iam Morillo is a 44 y.o., male.      Pre-op Assessment    I have reviewed the Patient Summary Reports.     I have reviewed the Nursing Notes. I have reviewed the NPO Status.   I have reviewed the Medications.     Review of Systems  Anesthesia Hx:  No problems with previous Anesthesia                Social:  Non-Smoker, No Alcohol Use       Hematology/Oncology:  Hematology Normal   Oncology Normal                                   EENT/Dental:  EENT/Dental Normal           Cardiovascular:  Cardiovascular Normal                                              Pulmonary:  Pulmonary Normal                       Renal/:  Renal/ Normal                 Hepatic/GI:  Hepatic/GI Normal                    Musculoskeletal:  Musculoskeletal Normal                Neurological:  Neurology Normal                                      Endocrine:  Endocrine Normal          Obesity / BMI > 30  Dermatological:  Skin Normal    Psych:  Psychiatric Normal                    Physical Exam  General: Well nourished    Airway:  Mallampati: III / III  Mouth Opening: Normal  TM Distance: > 6 cm  Tongue: Normal  Neck ROM: Normal ROM    Chest/Lungs:  Clear to auscultation, Normal Respiratory Rate    Heart:  Rate: Normal  Rhythm: Regular Rhythm        Anesthesia Plan  Type of Anesthesia, risks & benefits discussed:    Anesthesia Type: Gen Supraglottic Airway  Intra-op Monitoring Plan: Standard ASA Monitors  Post Op Pain Control Plan: multimodal analgesia  Induction:  IV  Informed Consent: Informed consent signed with the Patient and all parties understand the risks and agree with anesthesia plan.  All questions answered. Patient consented to blood products? Yes  ASA Score: 2  Day of Surgery Review of History & Physical: H&P Update referred to the surgeon/provider.I have interviewed and examined the patient. I have reviewed  the patient's H&P dated:     Ready For Surgery From Anesthesia Perspective.     .

## 2025-02-18 NOTE — ANESTHESIA POSTPROCEDURE EVALUATION
Anesthesia Post Evaluation    Patient: Iam Morillo    Procedure(s) Performed: Procedure(s) (LRB):  REPAIR, LIGAMENT, COLLATERAL, ANKLE, SECONDARY (Right)    Final Anesthesia Type: general      Patient location during evaluation: PACU  Patient participation: Yes- Able to Participate  Level of consciousness: awake and sedated  Post-procedure vital signs: reviewed and stable  Pain management: adequate  Airway patency: patent    PONV status at discharge: No PONV  Anesthetic complications: no      Cardiovascular status: blood pressure returned to baseline  Respiratory status: unassisted  Hydration status: euvolemic  Follow-up not needed.              Vitals Value Taken Time   /81 02/18/25 12:46   Temp 36.5 °C (97.7 °F) 02/18/25 11:34   Pulse 72 02/18/25 12:53   Resp 16 02/18/25 12:19   SpO2 95 % 02/18/25 12:53   Vitals shown include unfiled device data.      Event Time   Out of Recovery 12:15:00         Pain/Beltran Score: Pain Rating Prior to Med Admin: 9 (2/18/2025 12:00 PM)  Beltran Score: 10 (2/18/2025 12:33 PM)  Modified Beltran Score: 20 (2/18/2025  1:13 PM)

## 2025-02-20 NOTE — PT/OT/SLP EVAL
Physical Therapy Evaluation    Patient Name:  Iam Morillo   MRN:  [id number]    Recommendations:     Discharge Recommendations: No Therapy Indicated   Discharge Equipment Recommendations: none   Barriers to discharge: None    Assessment:     Iam Morillo is a 44 y.o. male admitted with a medical diagnosis of Complete tear of ligament of ankle.  He presents with the following impairments/functional limitations: orthopedic precautions Patient with good use of crutches nwb right le. Patient could benefit from knee scooter for long distance mobility.    Rehab Prognosis: Good; patient would benefit from acute skilled PT services to address these deficits and reach maximum level of function.    Recent Surgery: Procedure(s) (LRB):  REPAIR, LIGAMENT, COLLATERAL, ANKLE, SECONDARY (Right) 1 Day Post-Op    Plan:     During this hospitalization, patient to be seen 1 x/week to address the identified rehab impairments via gait training, therapeutic activities and progress toward the following goals:    Plan of Care Expires:       Subjective     Chief Complaint: post op pain  Patient/Family Comments/goals: plan is dc home   Pain/Comfort:       Patients cultural, spiritual, Yarsani conflicts given the current situation: no    Living Environment:  Lives with spouse  Prior to admission, patients level of function was  independent.  Equipment used at home: crutches.  DME owned (not currently used): none.  Upon discharge, patient will have assistance from spouse.    Objective:     Communicated with nurse prior to session.  Patient found supine with    upon PT entry to room.    General Precautions: Standard,    Orthopedic Precautions:RLE non weight bearing   Braces:    Respiratory Status: Room air    Exams:  na    Functional Mobility:  Gait: ambulated 20 feet with crutches nwb       AM-PAC 6 CLICK MOBILITY  Total Score:        Treatment & Education:  Nwb right le    Patient left supine with call button in reach.    GOALS:    Multidisciplinary Problems       Physical Therapy Goals       Not on file                    DME Justifications:  No DME recommended requiring DME justifications    History:     Past Medical History:   Diagnosis Date    Gout        Past Surgical History:   Procedure Laterality Date    REPAIR, LIGAMENT, COLLATERAL, ANKLE, SECONDARY Right 2/18/2025    Procedure: REPAIR, LIGAMENT, COLLATERAL, ANKLE, SECONDARY;  Surgeon: Yonatan Cross MD;  Location: [facility];  Service: Orthopedics;  Laterality: Right;       Time Tracking:     PT Received On: 02/18/25  PT Start Time: 1230     PT Stop Time: 1255  PT Total Time (min): 25 min     Billable Minutes: Evaluation 20 02/19/2025

## 2025-02-25 LAB
OHS QRS DURATION: 78 MS
OHS QTC CALCULATION: 441 MS

## 2025-03-05 ENCOUNTER — OFFICE VISIT (OUTPATIENT)
Dept: ORTHOPEDICS | Facility: CLINIC | Age: 45
End: 2025-03-05
Payer: OTHER MISCELLANEOUS

## 2025-03-05 VITALS
TEMPERATURE: 98 F | WEIGHT: 225 LBS | DIASTOLIC BLOOD PRESSURE: 80 MMHG | BODY MASS INDEX: 30.48 KG/M2 | HEART RATE: 77 BPM | OXYGEN SATURATION: 98 % | HEIGHT: 72 IN | SYSTOLIC BLOOD PRESSURE: 122 MMHG

## 2025-03-05 DIAGNOSIS — Z47.89 ENCOUNTER FOR ORTHOPEDIC FOLLOW-UP CARE: Primary | ICD-10-CM

## 2025-03-05 DIAGNOSIS — S93.409A COMPLETE TEAR OF LIGAMENT OF ANKLE: ICD-10-CM

## 2025-03-05 PROCEDURE — 29405 APPL SHORT LEG CAST: CPT | Mod: PBBFAC | Performed by: ORTHOPAEDIC SURGERY

## 2025-03-05 PROCEDURE — 99999 PR PBB SHADOW E&M-EST. PATIENT-LVL III: CPT | Mod: PBBFAC,,, | Performed by: ORTHOPAEDIC SURGERY

## 2025-03-05 PROCEDURE — 99999PBSHW PR PBB SHADOW TECHNICAL ONLY FILED TO HB: Mod: PBBFAC,,,

## 2025-03-05 PROCEDURE — 99213 OFFICE O/P EST LOW 20 MIN: CPT | Mod: PBBFAC | Performed by: ORTHOPAEDIC SURGERY

## 2025-03-05 RX ORDER — HYDROCODONE BITARTRATE AND ACETAMINOPHEN 10; 325 MG/1; MG/1
1 TABLET ORAL EVERY 6 HOURS PRN
Qty: 28 TABLET | Refills: 0 | Status: SHIPPED | OUTPATIENT
Start: 2025-03-05

## 2025-03-05 NOTE — PROGRESS NOTES
Patient is here for follow-up of his Brostrom Bolanos with a construction.  At this time in his wounds are clean and dry.  Neurovascularly he is intact distally.  He has taken out of the splint.  Placed in short-leg cast.  Keep him toe-touch weight-bearing.  I will follow him up 4 weeks.  We will place him into a cam walker tight time start PT.  Currently were let the tissues heal.  Keep him in his cast.

## 2025-04-02 ENCOUNTER — OFFICE VISIT (OUTPATIENT)
Dept: ORTHOPEDICS | Facility: CLINIC | Age: 45
End: 2025-04-02
Payer: OTHER MISCELLANEOUS

## 2025-04-02 VITALS
RESPIRATION RATE: 18 BRPM | WEIGHT: 222 LBS | BODY MASS INDEX: 30.07 KG/M2 | SYSTOLIC BLOOD PRESSURE: 128 MMHG | OXYGEN SATURATION: 97 % | HEIGHT: 72 IN | DIASTOLIC BLOOD PRESSURE: 87 MMHG | HEART RATE: 84 BPM

## 2025-04-02 DIAGNOSIS — Z47.89 ENCOUNTER FOR ORTHOPEDIC FOLLOW-UP CARE: Primary | ICD-10-CM

## 2025-04-02 PROCEDURE — 99214 OFFICE O/P EST MOD 30 MIN: CPT | Mod: PBBFAC | Performed by: ORTHOPAEDIC SURGERY

## 2025-04-02 PROCEDURE — 99024 POSTOP FOLLOW-UP VISIT: CPT | Mod: ,,, | Performed by: ORTHOPAEDIC SURGERY

## 2025-04-02 PROCEDURE — 99999 PR PBB SHADOW E&M-EST. PATIENT-LVL IV: CPT | Mod: PBBFAC,,, | Performed by: ORTHOPAEDIC SURGERY

## 2025-04-02 RX ORDER — ALLOPURINOL 300 MG/1
300 TABLET ORAL
COMMUNITY
Start: 2024-12-19

## 2025-04-02 NOTE — PROGRESS NOTES
Patient is here for follow-up of his right ankle Brostrom Bolanos ankle repair.  At this time I took him out of his cast.  Placed him in a cam walker.  I will have therapy start working on strengthening.  Still having knee pain.  I will check him back in 4 weeks.  Hopefully at that time in his ankle is doing better we can focus on the knee.  This time he is still has a slight effusion of the knee.  Like to get in his ankle rehab before we do anything to his knee.  Neurovascularly he is intact distally.  He is off work.

## 2025-04-02 NOTE — LETTER
April 2, 2025      [facility]  [address] [place] [id number]  Phone: [phone]  Fax: [phone]       Patient: Iam Morillo   YOB: 1980  Date of Visit: 04/02/2025    To Whom It May Concern:    Kit Morillo  was at [facility] on 04/02/2025. The patient is to remain off work until his next appointment in one month. If you have any questions or concerns, or if I can be of further assistance, please do not hesitate to contact me.    Sincerely,    MD Yahir Pop MA

## 2025-04-07 ENCOUNTER — CLINICAL SUPPORT (OUTPATIENT)
Dept: REHABILITATION | Facility: HOSPITAL | Age: 45
End: 2025-04-07
Payer: OTHER MISCELLANEOUS

## 2025-04-07 DIAGNOSIS — Z98.890 S/P REPAIR OF LIGAMENT OF ANKLE: Primary | ICD-10-CM

## 2025-04-07 DIAGNOSIS — Z47.89 ENCOUNTER FOR ORTHOPEDIC FOLLOW-UP CARE: ICD-10-CM

## 2025-04-07 PROCEDURE — 97162 PT EVAL MOD COMPLEX 30 MIN: CPT

## 2025-04-07 PROCEDURE — 97110 THERAPEUTIC EXERCISES: CPT

## 2025-04-07 NOTE — PROGRESS NOTES
Outpatient Rehab    Physical Therapy Evaluation    Patient Name: Iam Morillo  MRN: [id number]  YOB: 1980  Encounter Date: 4/7/2025    Therapy Diagnosis: right ankle Brostrom Bolanos ankle repair/ATFL  Physician: Yonatan Cross MD    Physician Orders: Eval and Treat  Medical Diagnosis: Encounter for orthopedic follow-up care    Visit # / Visits Authorized:  1 / 1  Insurance Authorization Period: 4/2/2025 to 4/2/2026  Date of Evaluation: 4/7/2025  DOS: 2/18/2025  Plan of Care Certification: 4/7/2025 to 6/6/2025     Time In: 1055   Time Out: 1140  Total Time: 45   Total Billable Time: 45    Intake Outcome Measure for FOTO Survey    Therapist reviewed FOTO scores for Iam Morillo on 4/7/2025.   FOTO report - see Media section or FOTO account episode details.     Intake Score: 51%         Subjective   History of Present Illness  Iam is a 44 y.o. male who reports to physical therapy with a chief concern of Right ankle pain.     The patient reports a medical diagnosis of S/P repair of ligament of ankle. The patient has experienced this issue since 02/18/25. Patient reports a surgery of right ankle Brostrom Bolanos ankle repair. Surgery occurred on 02/18/25. Diagnostic tests related to this condition: MRI studies and X-ray.        History of Present Condition/Illness: Date of onset: 9/30/2024   History of current condition - Iam reports: stepped on a piece of pipe and rolled right ankle - it has been painful and swollen since - right knee became painful and swollen about 3 days later - he was checked for a blood clot - he has had xrays of knee and ankle and an MRI of the ankle - the ATFL could not be visualized - he has only been in a boot since 10/28/24 - sleeps in boot also - is also having severe pain in the right knee - is going later today to have fluid drained off - cannot fully extend right knee and has difficulty lifting leg - says the quad seems to have shut down - history of bilateral  meniscus tears in right knee - had surgery in 2012 - also states he cannot put weight on the right lower extremity due to the knee. Patient underwent surgery to repair ligaments in right ankle 2/18/2025. Patient will have to undergo right knee surgery after ankle rehab.     Pain     Patient reports a current pain level of 0/10. Pain at best is reported as 0/10. Pain at worst is reported as 4/10.   Location: Right ankle  Clinical Progression (since onset): Stable  Pain Qualities: Aching, Dull  Pain-Relieving Factors: Activity modification, Elevation, Ice, Immobilization, Medications - over-the-counter, Medications - prescription, Rest, Standing, Walking, Stretching  Pain-Aggravating Factors: Exercise, Standing, Walking, Stretching         Review of Systems  Patient reports: Decreased Range of Motion, Functional Changes, Gout, Joint Pain, Stiffness, and Unable to Bear Weight        Living Arrangements  Living Situation  Living Arrangements: Spouse/significant other    Home Setup  Number of Levels in Home: One level  Existing Accessibility Options: Ramp  Primary Bedroom: 1st floor  Primary Bathroom: 1st floor  Bathroom Shower/Tub: Walk-in shower    Equipment/Treatments  Mobility Equipment: Axillary crutches        Employment  Patient reports: Does the patient's condition impact their ability to work?  Employment Status: On leave   Currently covered by workman's comp      Past Medical History/Physical Systems Review:   Iam Morillo  has a past medical history of Gout.    Iam Morillo  has a past surgical history that includes repair, ligament, collateral, ankle, secondary (Right, 2/18/2025).    Iam has a current medication list which includes the following prescription(s): allopurinol, colchicine, hydrocodone-acetaminophen, ibuprofen, prednisone, and tizanidine.    Review of patient's allergies indicates:  No Known Allergies     Objective   Bracing  Patient presents with a Right ankle/foot brace. The  ankle/foot brace type is Other (Comment). CAM boot.           Ankle/Foot Observations  Right Ankle/Foot Observations  Present: Atrophy, Edema, and Incision  Right Ankle/Foot Incision Observations  Present: Clean and Dry  Not Present: Drainage            Pulses  Ankle/Foot Pulses  Right Dorsalis Pedis Pulse: Moderate  Right Posterior Tibial Pulse: Moderate         Ankle/Foot Swelling  Location of Measurement Right  (cm) Left  (cm)   Metatarsal Head 26.3 25.2   Figure 8 46.2 46.2   Malleolus 28.7 28.1             Ankle/Foot Range of Motion   Right Ankle/Foot   Active (deg) Passive (deg) Pain   Dorsiflexion (KE) -14 -5     Dorsiflexion (KF)         Plantar Flexion 20 30     Ankle Inversion 10 12     Ankle Eversion 10 12     Subtalar Inversion         Subtalar Eversion         Great Toe MTP Flexion         Great Toe MTP Extension         Great Toe IP Flexion             Left Ankle/Foot   Active (deg) Passive (deg) Pain   Dorsiflexion (KE) 10 15     Dorsiflexion (KF)         Plantar Flexion 50 50     Ankle Inversion 30 35     Ankle Eversion 15 20     Subtalar Inversion         Subtalar Eversion         Great Toe MTP Flexion         Great Toe MTP Extension         Great Toe IP Flexion                            Ankle/Foot Strength - Planes of Motion   Right Strength Right Pain Left Strength Left  Pain   Dorsiflexion (L4) 2-   5     Plantar Flexion (S1) 3-   5     Inversion 2-   5     Eversion 2-   5     Great Toe Flexion 4-   5     Great Toe Extension (L5) 4-   5     Lesser Toes Flexion     5     Lesser Toes Extension     5            Knee Special Tests  Right Proximal Tibia and Fibula Joint: Hypomobile  Left Proximal Tibia and Fibula Joint: Normal             Ankle/Foot Joint Mobility  Right Ankle/Foot Joint Mobility  Hypomobile: Proximal Tibia and Fibula Joint, Distal Tibia and Fibula Joint, and Talocrural Joint  Left Ankle/Foot Joint Mobility  Normal: Proximal Tibia and Fibula Joint, Distal Tibia and Fibula Joint, and  Talocrural Joint               Gait Analysis  Base of Support: Wide  Gait Pattern: Antalgic  Walking Speed: Decreased    Right Side Walking Observations  Decreased: Stance Time and Step Length  Right Foot Contact Pattern: Flat foot    Left Side Walking Observations  Decreased: Step Length     Trunk Observations During Gait: Left lateral lean over stance limb           Treatment:  Therapeutic Exercise  TE 1: HEP training      Time Entry(in minutes):  PT Evaluation (Moderate) Time Entry: 35  Therapeutic Exercise Time Entry: 10    Assessment & Plan   Assessment  Iam presents with a condition of Moderate complexity.   Presentation of Symptoms: Stable  Will Comorbidities Impact Care: Yes  Right ACL tear and chondral lesions    Functional Limitations: Activity tolerance, Ambulating on uneven surfaces, Completing work/school activities, Decreased ambulation distance/endurance, Functional mobility, Gait limitations, Getting off the floor, Increased risk of fall, Maintaining balance, Painful locomotion/ambulation, Participating in leisure activities, Proprioception, Range of motion, Squatting  Impairments: Abnormal gait, Abnormal muscle firing, Abnormal or restricted range of motion, Activity intolerance, Impaired balance, Impaired physical strength, Lack of appropriate home exercise program, Pain with functional activity, Weight-bearing intolerance    Patient Goal for Therapy (PT): To return to normal activities and work.  Prognosis: Good  Assessment Details: Patient presents with diagnosis of right ankle Brostrom Bolanos ankle repair. Patient's ATFL and CFL were repaired. Patient was NWB'ing for 6 weeks and now is in CAM boot using axillary crutches but is WBAT. Patient exhibits right ankle pain, edema, decreased range of motion, abnormal gait, impaired balance and weakness of right ankle musculature. Patient will benefit from skilled Physical Therapy intervention to address all deficits and help patient to return to  their prior level of function.      Plan  From a physical therapy perspective, the patient would benefit from: Skilled Rehab Services    Planned therapy interventions include: Therapeutic exercise, Therapeutic activities, Neuromuscular re-education, and Manual therapy.    Planned modalities to include: Cryotherapy (cold pack), Thermotherapy (hot pack), and Vasopneumatic pump.        Visit Frequency: 2 times Per Week for 8 Weeks.       This plan was discussed with Patient.   Discussion participants: Agreed Upon Plan of Care             Patient's spiritual, cultural, and educational needs considered and patient agreeable to plan of care and goals.     Education  Education was done with Patient. The patient's learning style includes Demonstration and Pictures/video. The patient Verbalizes understanding.                 Goals:   Active       Ambulation/movement       Patient will walk >1000' with no pain, deviations or instability.       Start:  04/07/25    Expected End:  06/06/25               Functional outcome       Patient stated goal: To return to normal activities and work.        Start:  04/07/25    Expected End:  06/06/25            Patient will demonstrate independence in home program for support of progression       Start:  04/07/25    Expected End:  04/25/25               Pain       Patient will report no pain demonstrating a reduction of overall pain       Start:  04/07/25    Expected End:  06/06/25               Range of Motion       Patient will achieve right ankle dorsiflexion AROM 5 degrees       Start:  04/07/25    Expected End:  06/06/25            Patient will achieve right ankle plantar flexion AROM 40 degrees       Start:  04/07/25    Expected End:  06/06/25            Patient will achieve right ankle inversion AROM 20 degrees       Start:  04/07/25    Expected End:  06/06/25            Patient will achieve right ankle eversion AROM 20 degrees       Start:  04/07/25    Expected End:  06/06/25                Strength       Patient will achieve right ankle dorsiflexion strength of 4-/5       Start:  04/07/25    Expected End:  06/06/25            Patient will achieve right ankle plantar flexion strength of 4/5       Start:  04/07/25    Expected End:  06/06/25            Patient will achieve right ankle inversion strength of 4-/5       Start:  04/07/25    Expected End:  06/06/25            Patient will achieve right ankle eversion strength of 4-/5       Start:  04/07/25    Expected End:  06/06/25                DAREN RAYMUNDO, PT

## 2025-04-11 ENCOUNTER — CLINICAL SUPPORT (OUTPATIENT)
Dept: REHABILITATION | Facility: HOSPITAL | Age: 45
End: 2025-04-11
Payer: OTHER MISCELLANEOUS

## 2025-04-11 DIAGNOSIS — Z98.890 S/P REPAIR OF LIGAMENT OF ANKLE: Primary | ICD-10-CM

## 2025-04-11 PROCEDURE — 97110 THERAPEUTIC EXERCISES: CPT | Mod: CQ

## 2025-04-11 PROCEDURE — 97112 NEUROMUSCULAR REEDUCATION: CPT | Mod: CQ

## 2025-04-11 NOTE — PROGRESS NOTES
"  Outpatient Rehab    Physical Therapy Visit    Patient Name: Ima Morillo  MRN: [id number]  YOB: 1980  Encounter Date: 4/11/2025    Therapy Diagnosis:   Encounter Diagnosis   Name Primary?    S/P repair of ligament of ankle Yes     Physician: Yonatan Cross MD    Physician Orders: Eval and Treat  Medical Diagnosis: Encounter for orthopedic follow-up care    Visit # / Visits Authorized:  1 / 24  Insurance Authorization Period: 4/7/2025 to 4/1/2026  Date of Evaluation: 4/2/25  Plan of Care Certification: 4/2/25 to 6/6/25  DOS: 2/18/2025      PT/PTA: PTA   Number of PTA visits since last PT visit:1  Time In: 0930   Time Out: 1018  Total Time: 48   Total Billable Time:  48 min    FOTO:  Intake Score: 51%  Survey Score 1:  %  Survey Score 2:  %         Subjective   no pain today.  he arrived ambulator with no crutches CAM boot donned..  Pain reported as 0/10.      Objective            Treatment:  Therapeutic Exercise  TE 1: nustep x 5 min  TE 2: calf/hs stretch using strap 3 x 30 sec hold  TE 3: Supine QS 15x 5 s/h  TE 4: SLR 10x 5 s/h  TE 5: TKE 10 x 5 s/h using green strap  Balance/Neuromuscular Re-Education  NMR 1: PF/DF 30x yellow TB  NMR 2: Ankle INV/EV 30x  NMR 3: Ankle Circles CW/CC 30x each way  Therapeutic Activity  TA 2: squats at rail 10x pain free range  TA 3: Cybex leg press bruce 5 plates 15x    Time Entry(in minutes):  Neuromuscular Re-Education Time Entry: 15  Therapeutic Activity Time Entry: 8  Therapeutic Exercise Time Entry: 25    Assessment & Plan   Assessment: recieved POC from Wilberto Quezada, PT. Today was his first tx since eval. He reports compliance with HEP. No pain on arrival. He voiced loud "popping" of his achilles tendon when prolong sitting to stand position, at home which was painful but tolerable to ambulate on afterwards. He reports he does not wear the CAM boot at home. Added several light stretches/ROM ankle/knee and WB exercises/activities for ankle knee and hip all " performing well. Mild c/o right knee soreness/pain with extension activity but tolerable. Will continue to progress as needed.  Evaluation/Treatment Tolerance: Patient tolerated treatment well    Patient will continue to benefit from skilled outpatient physical therapy to address the deficits listed in the problem list box on initial evaluation, provide pt/family education and to maximize pt's level of independence in the home and community environment.     Patient's spiritual, cultural, and educational needs considered and patient agreeable to plan of care and goals.           Plan: Continue with current POC.    Goals:   Active       Ambulation/movement       Patient will walk >1000' with no pain, deviations or instability. (Progressing)       Start:  04/07/25    Expected End:  06/06/25               Functional outcome       Patient stated goal: To return to normal activities and work.  (Progressing)       Start:  04/07/25    Expected End:  06/06/25            Patient will demonstrate independence in home program for support of progression (Progressing)       Start:  04/07/25    Expected End:  04/25/25               Pain       Patient will report no pain demonstrating a reduction of overall pain (Progressing)       Start:  04/07/25    Expected End:  06/06/25               Range of Motion       Patient will achieve right ankle dorsiflexion AROM 5 degrees (Progressing)       Start:  04/07/25    Expected End:  06/06/25            Patient will achieve right ankle plantar flexion AROM 40 degrees (Progressing)       Start:  04/07/25    Expected End:  06/06/25            Patient will achieve right ankle inversion AROM 20 degrees (Progressing)       Start:  04/07/25    Expected End:  06/06/25            Patient will achieve right ankle eversion AROM 20 degrees (Progressing)       Start:  04/07/25    Expected End:  06/06/25               Strength       Patient will achieve right ankle dorsiflexion strength of 4-/5  (Progressing)       Start:  04/07/25    Expected End:  06/06/25            Patient will achieve right ankle plantar flexion strength of 4/5 (Progressing)       Start:  04/07/25    Expected End:  06/06/25            Patient will achieve right ankle inversion strength of 4-/5 (Progressing)       Start:  04/07/25    Expected End:  06/06/25            Patient will achieve right ankle eversion strength of 4-/5 (Progressing)       Start:  04/07/25    Expected End:  06/06/25                Suzanne Fernandez PTA

## 2025-04-14 ENCOUNTER — CLINICAL SUPPORT (OUTPATIENT)
Dept: REHABILITATION | Facility: HOSPITAL | Age: 45
End: 2025-04-14
Payer: OTHER MISCELLANEOUS

## 2025-04-14 DIAGNOSIS — Z98.890 S/P REPAIR OF LIGAMENT OF ANKLE: Primary | ICD-10-CM

## 2025-04-14 PROCEDURE — 97530 THERAPEUTIC ACTIVITIES: CPT | Mod: CQ

## 2025-04-14 PROCEDURE — 97112 NEUROMUSCULAR REEDUCATION: CPT | Mod: CQ

## 2025-04-14 PROCEDURE — 97110 THERAPEUTIC EXERCISES: CPT | Mod: CQ

## 2025-04-14 NOTE — PROGRESS NOTES
"  Outpatient Rehab    Physical Therapy Visit    Patient Name: Iam Morillo  MRN: [id number]  YOB: 1980  Encounter Date: 4/14/2025    Therapy Diagnosis:   Encounter Diagnosis   Name Primary?    S/P repair of ligament of ankle Yes     Physician: Yonatan Cross MD    Physician Orders: Eval and Treat  Medical Diagnosis: Encounter for orthopedic follow-up care    Visit # / Visits Authorized:  2 / 24  Insurance Authorization Period: 4/7/2025 to 4/1/2026  Date of Evaluation: 4/7/25  Plan of Care Certification: 4/7/25 to 6/6/25     PT/PTA: PTA   Number of PTA visits since last PT visit:2  Time In: 0957   Time Out: 1040  Total Time: 43   Total Billable Time:  43    FOTO:  Intake Score: 51%  Survey Score 1:  %  Survey Score 2:  %         Subjective             Objective            Treatment:  Therapeutic Exercise  TE 1: nustep x 5 min  TE 2: calf/hs stretch using strap 3 x 30 sec hold  TE 3: Supine QS 15x 5 s/h small maroon bolster  TE 4: SLR 10x 5 s/h with ankle DF  TE 5: TKE 10 x 5 s/h  TE 6: ABC's x 2  Balance/Neuromuscular Re-Education  NMR 1: PF/DF 30x yellow TB  NMR 2: Ankle INV/EV 30x  NMR 3: Ankle Circles CW/CC 30x each way  Therapeutic Activity  TA 1: Cybex HS curls 4 plates 15x  TA 2: Squats at rail 10x pain free range  TA 3: Cybex leg press bruce 6 plates 20x  TA 4: FSU on 6 inch step 15x-- pt requested 6" step    Time Entry(in minutes):  Neuromuscular Re-Education Time Entry: 8  Therapeutic Activity Time Entry: 20  Therapeutic Exercise Time Entry: 15    Assessment & Plan   Assessment: Patient arrived with 3/10 right ankle and 4/10 right knee pain. He reports he did alot of walking over the weekend without the boot on. Added HS curls on Cybex machine for strengthening  Had pt perform FSU on 4" step but pt requested to perform on 6" step. He did demo some right quad weakness but tolerable. Increased weight with Cybex Leg press with pt reporting "grinding" sensation along the medial aspect of his " patella. He denied the need for modalities for pain management at this time. Will continue to progress as able.  Evaluation/Treatment Tolerance: Patient tolerated treatment well    Patient will continue to benefit from skilled outpatient physical therapy to address the deficits listed in the problem list box on initial evaluation, provide pt/family education and to maximize pt's level of independence in the home and community environment.     Patient's spiritual, cultural, and educational needs considered and patient agreeable to plan of care and goals.           Plan: Continue with current POC.    Goals:   Active       Ambulation/movement       Patient will walk >1000' with no pain, deviations or instability. (Progressing)       Start:  04/07/25    Expected End:  06/06/25               Functional outcome       Patient stated goal: To return to normal activities and work.  (Progressing)       Start:  04/07/25    Expected End:  06/06/25            Patient will demonstrate independence in home program for support of progression (Progressing)       Start:  04/07/25    Expected End:  04/25/25               Pain       Patient will report no pain demonstrating a reduction of overall pain (Progressing)       Start:  04/07/25    Expected End:  06/06/25               Range of Motion       Patient will achieve right ankle dorsiflexion AROM 5 degrees (Progressing)       Start:  04/07/25    Expected End:  06/06/25            Patient will achieve right ankle plantar flexion AROM 40 degrees (Progressing)       Start:  04/07/25    Expected End:  06/06/25            Patient will achieve right ankle inversion AROM 20 degrees (Progressing)       Start:  04/07/25    Expected End:  06/06/25            Patient will achieve right ankle eversion AROM 20 degrees (Progressing)       Start:  04/07/25    Expected End:  06/06/25               Strength       Patient will achieve right ankle dorsiflexion strength of 4-/5 (Progressing)        Start:  04/07/25    Expected End:  06/06/25            Patient will achieve right ankle plantar flexion strength of 4/5 (Progressing)       Start:  04/07/25    Expected End:  06/06/25            Patient will achieve right ankle inversion strength of 4-/5 (Progressing)       Start:  04/07/25    Expected End:  06/06/25            Patient will achieve right ankle eversion strength of 4-/5 (Progressing)       Start:  04/07/25    Expected End:  06/06/25                Suzanne Fernandez PTA

## 2025-04-15 ENCOUNTER — CLINICAL SUPPORT (OUTPATIENT)
Dept: REHABILITATION | Facility: HOSPITAL | Age: 45
End: 2025-04-15
Payer: OTHER MISCELLANEOUS

## 2025-04-15 DIAGNOSIS — Z98.890 S/P REPAIR OF LIGAMENT OF ANKLE: Primary | ICD-10-CM

## 2025-04-15 PROCEDURE — 97530 THERAPEUTIC ACTIVITIES: CPT | Mod: CQ

## 2025-04-15 PROCEDURE — 97110 THERAPEUTIC EXERCISES: CPT | Mod: CQ

## 2025-04-15 PROCEDURE — 97140 MANUAL THERAPY 1/> REGIONS: CPT | Mod: CQ

## 2025-04-15 PROCEDURE — 97112 NEUROMUSCULAR REEDUCATION: CPT | Mod: CQ

## 2025-04-15 NOTE — PROGRESS NOTES
"  Outpatient Rehab    Physical Therapy Visit    Patient Name: Iam Morillo  MRN: [id number]  YOB: 1980  Encounter Date: 4/15/2025    Therapy Diagnosis: No diagnosis found.  Physician: Yonatan Cross MD    Physician Orders: Eval and Treat  Medical Diagnosis: Encounter for orthopedic follow-up care    Visit # / Visits Authorized:  3 / 24  Insurance Authorization Period: 4/7/2025 to 4/1/2026  Date of Evaluation:   Plan of Care Certification: 4/7/25 to 6/6/25      PT/PTA: PTA   Number of PTA visits since last PT visit:3  Time In: 1127   Time Out: 1206  Total Time: 39   Total Billable Time:      FOTO:  Intake Score:  %  Survey Score 1:  %  Survey Score 2:  %         Subjective   knee pain > ankle pain.         Objective            Treatment:  Therapeutic Exercise  TE 1: nustep x 5 min  TE 2: calf/hs stretch using strap 3 x 30 sec hold  TE 3: AP 20x each  TE 4: Belt Stretch 4x20 sec  Manual Therapy  MT 1: Manual PF/DF  MT 2: Manual toe mobility  Balance/Neuromuscular Re-Education  NMR 1: PF/DF 30x green TB  NMR 2: Ankle INV/EV 30x  Therapeutic Activity  TA 3: Cybex leg press bruce 8 plates 30x  TA 4: FSU on 6 inch step 20x-- pt requested 6" step  TA 5: Squats 2x10    Time Entry(in minutes):  Manual Therapy Time Entry: 8  Neuromuscular Re-Education Time Entry: 10  Therapeutic Activity Time Entry: 10  Therapeutic Exercise Time Entry: 10    Assessment & Plan   Assessment: Pt tolerated exercises well with no complaints of ankle pain. Started off session working on mobility and added in manual PF/DF manuals as well as toe mobility.  Incision looks good with one spot with a suture coming out. Removed this with sterile tweezers. Followed this with WB exercises in walking boot with complaints of knee pain. Educated pt to keep up with his HEP.  Evaluation/Treatment Tolerance: Patient limited by pain    Patient will continue to benefit from skilled outpatient physical therapy to address the deficits listed in the " problem list box on initial evaluation, provide pt/family education and to maximize pt's level of independence in the home and community environment.     Patient's spiritual, cultural, and educational needs considered and patient agreeable to plan of care and goals.           Plan: Continue with current POC.    Goals:   Active       Ambulation/movement       Patient will walk >1000' with no pain, deviations or instability. (Progressing)       Start:  04/07/25    Expected End:  06/06/25               Functional outcome       Patient stated goal: To return to normal activities and work.  (Progressing)       Start:  04/07/25    Expected End:  06/06/25            Patient will demonstrate independence in home program for support of progression (Progressing)       Start:  04/07/25    Expected End:  04/25/25               Pain       Patient will report no pain demonstrating a reduction of overall pain (Progressing)       Start:  04/07/25    Expected End:  06/06/25               Range of Motion       Patient will achieve right ankle dorsiflexion AROM 5 degrees (Progressing)       Start:  04/07/25    Expected End:  06/06/25            Patient will achieve right ankle plantar flexion AROM 40 degrees (Progressing)       Start:  04/07/25    Expected End:  06/06/25            Patient will achieve right ankle inversion AROM 20 degrees (Progressing)       Start:  04/07/25    Expected End:  06/06/25            Patient will achieve right ankle eversion AROM 20 degrees (Progressing)       Start:  04/07/25    Expected End:  06/06/25               Strength       Patient will achieve right ankle dorsiflexion strength of 4-/5 (Progressing)       Start:  04/07/25    Expected End:  06/06/25            Patient will achieve right ankle plantar flexion strength of 4/5 (Progressing)       Start:  04/07/25    Expected End:  06/06/25            Patient will achieve right ankle inversion strength of 4-/5 (Progressing)       Start:  04/07/25     Expected End:  06/06/25            Patient will achieve right ankle eversion strength of 4-/5 (Progressing)       Start:  04/07/25    Expected End:  06/06/25                Vishnu Potts PTA

## 2025-04-21 ENCOUNTER — CLINICAL SUPPORT (OUTPATIENT)
Dept: REHABILITATION | Facility: HOSPITAL | Age: 45
End: 2025-04-21
Payer: OTHER MISCELLANEOUS

## 2025-04-21 DIAGNOSIS — Z98.890 S/P REPAIR OF LIGAMENT OF ANKLE: Primary | ICD-10-CM

## 2025-04-21 PROCEDURE — 97110 THERAPEUTIC EXERCISES: CPT

## 2025-04-21 PROCEDURE — 97112 NEUROMUSCULAR REEDUCATION: CPT

## 2025-04-21 PROCEDURE — 97530 THERAPEUTIC ACTIVITIES: CPT

## 2025-04-21 PROCEDURE — 97140 MANUAL THERAPY 1/> REGIONS: CPT | Mod: KX

## 2025-04-21 NOTE — PROGRESS NOTES
Outpatient Rehab    Physical Therapy Visit    Patient Name: Iam Morillo  MRN: [id number]  YOB: 1980  Encounter Date: 4/21/2025    Therapy Diagnosis:   Encounter Diagnosis   Name Primary?    S/P repair of ligament of ankle Yes     Physician: Yonatan Cross MD    Physician Orders: Eval and Treat  Medical Diagnosis: Encounter for orthopedic follow-up care    Visit # / Visits Authorized:  4 / 24  Insurance Authorization Period: 4/7/2025 to 4/1/2026  Date of Evaluation: 4/7/2025  Plan of Care Certification:  4/7/25 to 6/6/25      PT/PTA:     Number of PTA visits since last PT visit:   Time In: 1045   Time Out: 1133  Total Time: 48   Total Billable Time: 48    FOTO:  Intake Score:  %  Survey Score 1:  %  Survey Score 2:  %         Subjective   No ankle pain.  Pain reported as 0/10. Pt's main complaint is right knee pain    Objective            Treatment:  Therapeutic Exercise  TE 1: nustep x 5 min  TE 2: SB stretch 4 x 15 sec  TE 3: Quad stretch at stairs 4 x 15 sec  TE 4: HS stretch at stairs 4 x 15 sec  Manual Therapy  MT 1: Manual PF/DF  MT 2: Manual toe mobility  MT 3: scar massage  Balance/Neuromuscular Re-Education  NMR 1: PF/DF 30x blue TB  NMR 2: Ankle INV/EV with green TB 30x  NMR 4: Cable TKE's 2 x 10 with 4 plates and 2-3 sh  NMR 5: Great toe flexion/extension with red TB 2 x 10  Therapeutic Activity  TA 1: Cybex HS curls 4 plates 3 x 10  TA 3: Cybex leg press bruce 8 plates 3 x 10  TA 4: FSU on 6 inch step 2 x 10  TA 5: Squats 2x10    Time Entry(in minutes):  Manual Therapy Time Entry: 8  Neuromuscular Re-Education Time Entry: 15  Therapeutic Activity Time Entry: 15  Therapeutic Exercise Time Entry: 10    Assessment & Plan   Assessment: Current right ankle DF is 5 degrees active and 10 degrees passive. Instructed pt to wean out of boot and into shoe with recommendation for ankle brace. Further progression of exercises today with pt tolerating well. Pt is progressing well with ankle but now  needs his right knee repaired.  Evaluation/Treatment Tolerance: Patient tolerated treatment well    Patient will continue to benefit from skilled outpatient physical therapy to address the deficits listed in the problem list box on initial evaluation, provide pt/family education and to maximize pt's level of independence in the home and community environment.     Patient's spiritual, cultural, and educational needs considered and patient agreeable to plan of care and goals.           Plan: Continue with current POC and advance as tolerated.    Goals:   Active       Ambulation/movement       Patient will walk >1000' with no pain, deviations or instability. (Progressing)       Start:  04/07/25    Expected End:  06/06/25               Functional outcome       Patient stated goal: To return to normal activities and work.  (Progressing)       Start:  04/07/25    Expected End:  06/06/25            Patient will demonstrate independence in home program for support of progression (Met)       Start:  04/07/25    Expected End:  04/25/25    Resolved:  04/21/25            Strength       Patient will achieve right ankle dorsiflexion strength of 4-/5 (Met)       Start:  04/07/25    Expected End:  06/06/25    Resolved:  04/21/25         Patient will achieve right ankle plantar flexion strength of 4/5 (Met)       Start:  04/07/25    Expected End:  06/06/25    Resolved:  04/21/25         Patient will achieve right ankle inversion strength of 4-/5 (Met)       Start:  04/07/25    Expected End:  06/06/25    Resolved:  04/21/25         Patient will achieve right ankle eversion strength of 4-/5 (Met)       Start:  04/07/25    Expected End:  06/06/25    Resolved:  04/21/25           Resolved       Pain       Patient will report no pain demonstrating a reduction of overall pain (Met)       Start:  04/07/25    Expected End:  06/06/25    Resolved:  04/21/25            Range of Motion       Patient will achieve right ankle dorsiflexion AROM 5  degrees (Met)       Start:  04/07/25    Expected End:  06/06/25    Resolved:  04/21/25         Patient will achieve right ankle plantar flexion AROM 40 degrees (Met)       Start:  04/07/25    Expected End:  06/06/25    Resolved:  04/21/25         Patient will achieve right ankle inversion AROM 20 degrees (Met)       Start:  04/07/25    Expected End:  06/06/25    Resolved:  04/21/25         Patient will achieve right ankle eversion AROM 20 degrees (Met)       Start:  04/07/25    Expected End:  06/06/25    Resolved:  04/21/25             DAREN RAYMUNDO, PT

## 2025-04-24 ENCOUNTER — CLINICAL SUPPORT (OUTPATIENT)
Dept: REHABILITATION | Facility: HOSPITAL | Age: 45
End: 2025-04-24
Payer: OTHER MISCELLANEOUS

## 2025-04-24 DIAGNOSIS — Z98.890 S/P REPAIR OF LIGAMENT OF ANKLE: Primary | ICD-10-CM

## 2025-04-24 PROCEDURE — 97110 THERAPEUTIC EXERCISES: CPT | Mod: CQ

## 2025-04-24 PROCEDURE — 97140 MANUAL THERAPY 1/> REGIONS: CPT | Mod: CQ

## 2025-04-24 PROCEDURE — 97112 NEUROMUSCULAR REEDUCATION: CPT | Mod: CQ

## 2025-04-24 PROCEDURE — 97530 THERAPEUTIC ACTIVITIES: CPT | Mod: CQ

## 2025-04-24 NOTE — PROGRESS NOTES
Outpatient Rehab    Physical Therapy Visit    Patient Name: Iam Morillo  MRN: [id number]  YOB: 1980  Encounter Date: 4/24/2025    Therapy Diagnosis:   Encounter Diagnosis   Name Primary?    S/P repair of ligament of ankle Yes     Physician: Yonatan Cross MD    Physician Orders: Eval and Treat  Medical Diagnosis: Encounter for orthopedic follow-up care    Visit # / Visits Authorized:  5 / 24  Insurance Authorization Period: 4/7/2025 to 4/1/2026  YOB: 1980  Encounter Date: 4/21/2025     PT/PTA:     Number of PTA visits since last PT visit:   Time In: 1045   Time Out: 1130  Total Time: 45   Total Billable Time: 45    FOTO:  Intake Score:  %  Survey Score 1:  %  Survey Score 2:  %         Subjective   Pain in ankle is a 2-3/10..  Pain reported as 3/10.      Objective            Treatment:  Therapeutic Exercise  TE 1: Bike x 5 min  TE 2: SB stretch 4 x 15 sec  TE 3: Quad stretch at stairs 4 x 15 sec  TE 4: HS stretch at stairs 4 x 15 sec  Manual Therapy  MT 4: IASTM x 8 minutes  Balance/Neuromuscular Re-Education  NMR 1: PF/DF 30x blue TB  NMR 2: Ankle INV/EV with blue TB 30x  NMR 3: Ankle Circles CW/CC 30x each way  NMR 5: Great toe flexion/extension with red TB 2 x 10  Therapeutic Activity  TA 1: Cybex HS curls 4 plates 3 x 10  TA 2: Squats at rail 10x pain free range  TA 3: Cybex leg press bruce 8 plates 3 x 10  TA 5: Squats 2x10  TA 6: step ups 2 x 10  TA 7: lateral step downs 2 x 10    Time Entry(in minutes):  Manual Therapy Time Entry: 8  Neuromuscular Re-Education Time Entry: 15  Therapeutic Activity Time Entry: 12  Therapeutic Exercise Time Entry: 10    Assessment & Plan   Assessment: Case confernce with Jing Rebolledo DPT. Abhishek was able to perfrom all exercises without any difficulty or compliats of pain. IASTM performed to Right Lateral Maleoli around the incision to taget Peroneals. Strumming and sweeping technique used. Patient very sensitive to the treatment so touch  was very light. Brushing strikes used posterior to the incision due to hypersensitivity. Overall, he tolerated this well.  Evaluation/Treatment Tolerance: Patient tolerated treatment well    Patient will continue to benefit from skilled outpatient physical therapy to address the deficits listed in the problem list box on initial evaluation, provide pt/family education and to maximize pt's level of independence in the home and community environment.     Patient's spiritual, cultural, and educational needs considered and patient agreeable to plan of care and goals.     Education  Education was done with Patient. The patient's learning style includes Demonstration and Listening. The patient Verbalizes understanding and Demonstrates understanding.                 Plan: Continue with current POC and advance as tolerated.    Goals:   Active       Ambulation/movement       Patient will walk >1000' with no pain, deviations or instability. (Progressing)       Start:  04/07/25    Expected End:  06/06/25               Functional outcome       Patient stated goal: To return to normal activities and work.  (Progressing)       Start:  04/07/25    Expected End:  06/06/25            Patient will demonstrate independence in home program for support of progression (Met)       Start:  04/07/25    Expected End:  04/25/25    Resolved:  04/21/25            Strength       Patient will achieve right ankle dorsiflexion strength of 4-/5 (Met)       Start:  04/07/25    Expected End:  06/06/25    Resolved:  04/21/25         Patient will achieve right ankle plantar flexion strength of 4/5 (Met)       Start:  04/07/25    Expected End:  06/06/25    Resolved:  04/21/25         Patient will achieve right ankle inversion strength of 4-/5 (Met)       Start:  04/07/25    Expected End:  06/06/25    Resolved:  04/21/25         Patient will achieve right ankle eversion strength of 4-/5 (Met)       Start:  04/07/25    Expected End:  06/06/25    Resolved:   04/21/25           Resolved       Pain       Patient will report no pain demonstrating a reduction of overall pain (Met)       Start:  04/07/25    Expected End:  06/06/25    Resolved:  04/21/25            Range of Motion       Patient will achieve right ankle dorsiflexion AROM 5 degrees (Met)       Start:  04/07/25    Expected End:  06/06/25    Resolved:  04/21/25         Patient will achieve right ankle plantar flexion AROM 40 degrees (Met)       Start:  04/07/25    Expected End:  06/06/25    Resolved:  04/21/25         Patient will achieve right ankle inversion AROM 20 degrees (Met)       Start:  04/07/25    Expected End:  06/06/25    Resolved:  04/21/25         Patient will achieve right ankle eversion AROM 20 degrees (Met)       Start:  04/07/25    Expected End:  06/06/25    Resolved:  04/21/25             Negar Reece PTA

## 2025-04-28 ENCOUNTER — CLINICAL SUPPORT (OUTPATIENT)
Dept: REHABILITATION | Facility: HOSPITAL | Age: 45
End: 2025-04-28
Payer: OTHER MISCELLANEOUS

## 2025-04-28 DIAGNOSIS — Z98.890 S/P REPAIR OF LIGAMENT OF ANKLE: Primary | ICD-10-CM

## 2025-04-28 PROCEDURE — 97112 NEUROMUSCULAR REEDUCATION: CPT

## 2025-04-28 PROCEDURE — 97110 THERAPEUTIC EXERCISES: CPT

## 2025-04-28 PROCEDURE — 97530 THERAPEUTIC ACTIVITIES: CPT

## 2025-04-28 PROCEDURE — 97140 MANUAL THERAPY 1/> REGIONS: CPT | Mod: KX

## 2025-04-28 NOTE — PROGRESS NOTES
"  Outpatient Rehab    Physical Therapy Progress Note    Patient Name: Iam Morillo  MRN: [id number]  YOB: 1980  Encounter Date: 4/28/2025    Therapy Diagnosis:   Encounter Diagnosis   Name Primary?    S/P repair of ligament of ankle Yes     Physician: Yonatan Cross MD    Physician Orders: Eval and Treat  Medical Diagnosis: Encounter for orthopedic follow-up care    Visit # / Visits Authorized:  6 / 24  Insurance Authorization Period: 4/7/2025 to 4/1/2026  Date of Evaluation: 4/7/2025  Plan of Care Certification:  4/7/25 to 6/6/25      PT/PTA: PT   Number of PTA visits since last PT visit:   Time In: 1045   Time Out: 1130  Total Time: 45   Total Billable Time: 45    FOTO:  Intake Score:  %  Survey Score 1:  %  Survey Score 2:  %         Subjective   No pain on arrival but will get up to a 3-4/10 if he walks a lot..  Pain reported as 0/10. Pt's main complaint is right knee pain    Objective           Treatment:  Therapeutic Exercise  TE 1: Bike x 5 min  TE 2: SB stretch 4 x 15 sec  TE 3: Quad stretch at stairs 4 x 15 sec  TE 4: HS stretch at stairs 4 x 15 sec  TE 7: Prostretch 4 x 15 sec  Balance/Neuromuscular Re-Education  NMR 4: Cable TKE's 2 x 10 with 4 plates and 2-3 sh  NMR 5: Great toe flexion/extension with red TB 2 x 10  NMR 6: Hydrualic 4-way 2 x 10  NMR 7: SLS on foam 2 minutes  Therapeutic Activity  TA 1: Cybex HS curls 5 plates 3 x 10  TA 3: Cybex leg press bruce 8 plates 3 x 10  TA 4: FSU on 6 inch step 2 x 10  TA 5: Squats 2x10  TA 7: lateral step downs 6" 2 x 10  TA 8: Heel raises from step 3 x 10 with 15 sec stretch between sets    Time Entry(in minutes):  Manual Therapy Time Entry: 8  Neuromuscular Re-Education Time Entry: 15  Therapeutic Activity Time Entry: 12  Therapeutic Exercise Time Entry: 10    Assessment & Plan   Assessment: Pt arrived with tennis shoe and Donjoy knee brace on. Pt demonstrates improved right ankle motion and strength. Added additional strengthening exercises " and balance work today. Pt also exhibits improved scar mobility. Will continue to advance as tolerated.  Evaluation/Treatment Tolerance: Patient tolerated treatment well    Patient will continue to benefit from skilled outpatient physical therapy to address the deficits listed in the problem list box on initial evaluation, provide pt/family education and to maximize pt's level of independence in the home and community environment.     Patient's spiritual, cultural, and educational needs considered and patient agreeable to plan of care and goals.           Plan: Continue with current POC and advance as tolerated.    Goals:   Active       Ambulation/movement       Patient will walk >1000' with no pain, deviations or instability. (Progressing)       Start:  04/07/25    Expected End:  06/06/25               Functional outcome       Patient stated goal: To return to normal activities and work.  (Progressing)       Start:  04/07/25    Expected End:  06/06/25            Patient will demonstrate independence in home program for support of progression (Met)       Start:  04/07/25    Expected End:  04/25/25    Resolved:  04/21/25            Strength       Patient will achieve right ankle dorsiflexion strength of 4-/5 (Met)       Start:  04/07/25    Expected End:  06/06/25    Resolved:  04/21/25         Patient will achieve right ankle plantar flexion strength of 4/5 (Met)       Start:  04/07/25    Expected End:  06/06/25    Resolved:  04/21/25         Patient will achieve right ankle inversion strength of 4-/5 (Met)       Start:  04/07/25    Expected End:  06/06/25    Resolved:  04/21/25         Patient will achieve right ankle eversion strength of 4-/5 (Met)       Start:  04/07/25    Expected End:  06/06/25    Resolved:  04/21/25           Resolved       Pain       Patient will report no pain demonstrating a reduction of overall pain (Met)       Start:  04/07/25    Expected End:  06/06/25    Resolved:  04/21/25             Range of Motion       Patient will achieve right ankle dorsiflexion AROM 5 degrees (Met)       Start:  04/07/25    Expected End:  06/06/25    Resolved:  04/21/25         Patient will achieve right ankle plantar flexion AROM 40 degrees (Met)       Start:  04/07/25    Expected End:  06/06/25    Resolved:  04/21/25         Patient will achieve right ankle inversion AROM 20 degrees (Met)       Start:  04/07/25    Expected End:  06/06/25    Resolved:  04/21/25         Patient will achieve right ankle eversion AROM 20 degrees (Met)       Start:  04/07/25    Expected End:  06/06/25    Resolved:  04/21/25             DAREN RAYMUNDO, PT

## 2025-05-05 ENCOUNTER — CLINICAL SUPPORT (OUTPATIENT)
Dept: REHABILITATION | Facility: HOSPITAL | Age: 45
End: 2025-05-05
Payer: OTHER MISCELLANEOUS

## 2025-05-05 DIAGNOSIS — Z98.890 S/P REPAIR OF LIGAMENT OF ANKLE: Primary | ICD-10-CM

## 2025-05-05 PROCEDURE — 97110 THERAPEUTIC EXERCISES: CPT | Mod: CQ

## 2025-05-05 PROCEDURE — 97530 THERAPEUTIC ACTIVITIES: CPT | Mod: CQ

## 2025-05-05 PROCEDURE — 97112 NEUROMUSCULAR REEDUCATION: CPT | Mod: CQ

## 2025-05-05 NOTE — PROGRESS NOTES
"  Outpatient Rehab    Physical Therapy Visit    Patient Name: Iam Morillo  MRN: [id number]  YOB: 1980  Encounter Date: 5/5/2025    Therapy Diagnosis:   Encounter Diagnosis   Name Primary?    S/P repair of ligament of ankle Yes     Physician: Yonatan Cross MD    Physician Orders: Eval and Treat  Medical Diagnosis: Encounter for orthopedic follow-up care    Visit # / Visits Authorized:  7 / 24  Insurance Authorization Period: 4/7/2025 to 4/1/2026  Date of Evaluation:   Plan of Care Certification: 4/7/25 to 6/6/25      PT/PTA:     Number of PTA visits since last PT visit:   Time In: 1043   Time Out: 1124  Total Time (in minutes): 41   Total Billable Time (in minutes):      FOTO:  Intake Score:  %  Survey Score 2:  %  Survey Score 3:  %         Subjective   still knee pain/popping during walking/ADLs; ankle feels fine.         Objective            Treatment:  Therapeutic Exercise  TE 1: Bike x 5 min  TE 2: SB stretch 4 x 15 sec  TE 3: Soleus Stretch at Step 4x20 sec  TE 4: HS stretch at stairs 4 x 15 sec  Balance/Neuromuscular Re-Education  NMR 1: PF/DF 30x blue TB  NMR 2: Ankle INV/EV with blue TB 30x  NMR 4: Cable TKE's 2 x 10 with 4 plates and 2-3 sh  NMR 6: Hydrualic 4-way 2 x 10  NMR 7: SLS on foam 2 minutes  Therapeutic Activity  TA 1: Cybex HS curls 5 plates 3 x 10  TA 3: Cybex leg press bruce 8 plates 3 x 10  TA 4: FSU on 6 inch step 2 x 10  TA 6: step ups 2 x 10  TA 7: lateral step downs 6" 2 x 10  TA 8: Heel raises from step 3 x 10 with 15 sec stretch between sets  TA 9: Seated CR 8 pl single leg 20x    Time Entry(in minutes):  Neuromuscular Re-Education Time Entry: 10  Therapeutic Activity Time Entry: 23  Therapeutic Exercise Time Entry: 8    Assessment & Plan   Assessment: Pt doing well today with R knee pain during session. R ankle mobility is doing well and strength is improving. Pt demonstrates good ankle strategy on foam pad with minimal UE support required. Pt has follow up with MD " this week about his ankle/knee. Will continue to progress as able.  Evaluation/Treatment Tolerance: Patient tolerated treatment well    Patient will continue to benefit from skilled outpatient physical therapy to address the deficits listed in the problem list box on initial evaluation, provide pt/family education and to maximize pt's level of independence in the home and community environment.     Patient's spiritual, cultural, and educational needs considered and patient agreeable to plan of care and goals.           Plan: Continue with current POC and advance as tolerated.    Goals:   Active       Ambulation/movement       Patient will walk >1000' with no pain, deviations or instability. (Progressing)       Start:  04/07/25    Expected End:  06/06/25               Functional outcome       Patient stated goal: To return to normal activities and work.  (Progressing)       Start:  04/07/25    Expected End:  06/06/25            Patient will demonstrate independence in home program for support of progression (Met)       Start:  04/07/25    Expected End:  04/25/25    Resolved:  04/21/25            Strength       Patient will achieve right ankle dorsiflexion strength of 4-/5 (Met)       Start:  04/07/25    Expected End:  06/06/25    Resolved:  04/21/25         Patient will achieve right ankle plantar flexion strength of 4/5 (Met)       Start:  04/07/25    Expected End:  06/06/25    Resolved:  04/21/25         Patient will achieve right ankle inversion strength of 4-/5 (Met)       Start:  04/07/25    Expected End:  06/06/25    Resolved:  04/21/25         Patient will achieve right ankle eversion strength of 4-/5 (Met)       Start:  04/07/25    Expected End:  06/06/25    Resolved:  04/21/25           Resolved       Pain       Patient will report no pain demonstrating a reduction of overall pain (Met)       Start:  04/07/25    Expected End:  06/06/25    Resolved:  04/21/25            Range of Motion       Patient will  achieve right ankle dorsiflexion AROM 5 degrees (Met)       Start:  04/07/25    Expected End:  06/06/25    Resolved:  04/21/25         Patient will achieve right ankle plantar flexion AROM 40 degrees (Met)       Start:  04/07/25    Expected End:  06/06/25    Resolved:  04/21/25         Patient will achieve right ankle inversion AROM 20 degrees (Met)       Start:  04/07/25    Expected End:  06/06/25    Resolved:  04/21/25         Patient will achieve right ankle eversion AROM 20 degrees (Met)       Start:  04/07/25    Expected End:  06/06/25    Resolved:  04/21/25             Vishnu Potts, PTA

## 2025-05-07 ENCOUNTER — OFFICE VISIT (OUTPATIENT)
Dept: ORTHOPEDICS | Facility: CLINIC | Age: 45
End: 2025-05-07
Payer: OTHER MISCELLANEOUS

## 2025-05-07 VITALS
DIASTOLIC BLOOD PRESSURE: 79 MMHG | HEIGHT: 72 IN | OXYGEN SATURATION: 97 % | WEIGHT: 226 LBS | HEART RATE: 79 BPM | SYSTOLIC BLOOD PRESSURE: 127 MMHG | BODY MASS INDEX: 30.61 KG/M2

## 2025-05-07 DIAGNOSIS — S83.241A ACUTE MEDIAL MENISCUS TEAR, RIGHT, INITIAL ENCOUNTER: ICD-10-CM

## 2025-05-07 DIAGNOSIS — S83.511D SPRAIN OF ANTERIOR CRUCIATE LIGAMENT OF RIGHT KNEE, SUBSEQUENT ENCOUNTER: Primary | ICD-10-CM

## 2025-05-07 PROCEDURE — 99999 PR PBB SHADOW E&M-EST. PATIENT-LVL III: CPT | Mod: PBBFAC,,, | Performed by: ORTHOPAEDIC SURGERY

## 2025-05-07 PROCEDURE — 99213 OFFICE O/P EST LOW 20 MIN: CPT | Mod: PBBFAC | Performed by: ORTHOPAEDIC SURGERY

## 2025-05-07 RX ORDER — CIPROFLOXACIN 500 MG/1
500 TABLET ORAL
COMMUNITY
Start: 2025-05-01 | End: 2025-05-11

## 2025-05-07 NOTE — PATIENT INSTRUCTIONS
Your surgery is scheduled at [facility] in [place] for 05/27/2025    Pre-Op Testing: None    _______ Lab (Clinic Lab 1st Floor)  _______ Chest X-ray ([facility] 1st Floor)  _______ EKG (Clinic 2nd Floor)    *[facility] will contact you the day before surgery to give you the arrival time.    *A  is required to provide transportation for you the day of surgery.    *Do NOT eat or drink anything after midnight the night before your surgery.    *Bring all medications in their original bottles.    *Bring anything you may need for an overnight stay.     *Bathe with Hibiclens the night or morning before surgery.    *The morning of your surgery ONLY take blood pressure medications, heart medications, medications for acid reflux, and thyroid medications (the morning dose only).  *Take these medications with a sip of water.    *Do not take Insulin or Diabetic Medications the night before or the morning of your surgery, unless directed otherwise.    *Be sure that you have stopped blood thinners at the appropriate time, as instructed.  (_____ days prior to surgery)    *Bring your C-Pap machine if you have one.    *All jewelry and piercings MUST be removed prior to surgery.    *False eye lashes must be removed prior to surgery.    *Any questions regarding co-pays or deductibles with insurance, please contact the [facility] at #[phone].    *For Financial Assistance you may call #[phone] or #[phone].    Thank you for choosing Dr. Yonatan Cross for your Orthopedic needs.  We look forward to caring for you. If you have any questions, please contact our office at [phone].

## 2025-05-07 NOTE — LETTER
May 7, 2025      [facility]  [address] [place] [id number]  Phone: [phone]  Fax: [phone]       Patient: Iam Morillo   YOB: 1980  Date of Visit: 05/07/2025    To Whom It May Concern:    Kit Morillo  was at [facility] on 05/07/2025. The patient is to remain off work until next office visit. He is scheduled for right knee arthroscopy on 05/27/2025. If you have any questions or concerns, or if I can be of further assistance, please do not hesitate to contact me.    Sincerely,    MD Yahir Pop MA

## 2025-05-07 NOTE — PROGRESS NOTES
Patient is here for right ankle ligament repair he is now almost 12 weeks out he has been doing his therapy he has good motion little bit of swelling in his ankle is doing well bigger complaints in his knee he had the partial ACL injury he has a medial meniscus tear.  He is having pain on Gal's testing tenderness over the posteromedial joint line little bit of quad atrophy.  We discussed treatment options.  At this time in his knee is in his limiting factors for his getting him back to work.  Discussed risks and benefits surgery with him.  We are going to set him up for medial meniscus arthroscopy with debridement possible repair we will set this up in the near future MRI shows the tear of the midsubstance of the medial meniscus neurovascularly he is intact distally in the right lower extremity in his good motion of the knee.

## 2025-05-08 ENCOUNTER — CLINICAL SUPPORT (OUTPATIENT)
Dept: REHABILITATION | Facility: HOSPITAL | Age: 45
End: 2025-05-08
Payer: OTHER MISCELLANEOUS

## 2025-05-08 DIAGNOSIS — Z98.890 S/P REPAIR OF LIGAMENT OF ANKLE: Primary | ICD-10-CM

## 2025-05-08 PROCEDURE — 97110 THERAPEUTIC EXERCISES: CPT | Mod: CQ

## 2025-05-08 PROCEDURE — 97530 THERAPEUTIC ACTIVITIES: CPT | Mod: CQ

## 2025-05-08 PROCEDURE — 97112 NEUROMUSCULAR REEDUCATION: CPT | Mod: CQ

## 2025-05-08 NOTE — PROGRESS NOTES
Outpatient Rehab    Physical Therapy Visit    Patient Name: Iam Morillo  MRN: [id number]  YOB: 1980  Encounter Date: 5/8/2025    Therapy Diagnosis: No diagnosis found.  Physician: Yonatan Cross MD    Physician Orders: Eval and Treat  Medical Diagnosis: Encounter for orthopedic follow-up care    Visit # / Visits Authorized:  8 / 24  Insurance Authorization Period: 4/7/2025 to 4/1/2026       PT/PTA: PTA   Number of PTA visits since last PT visit:1  Time In: 1045   Time Out: 1128  Total Time (in minutes): 43   Total Billable Time (in minutes): 43    FOTO:  Intake Score:  %  Survey Score 2:  %  Survey Score 3:  %         Subjective   Patient main complaint is right knee..  Pain reported as 0/10.      Objective            Treatment:  Therapeutic Exercise  TE 1: Bike x 5 min  TE 2: SB stretch 4 x 15 sec  TE 3: Soleus Stretch at Step 4x20 sec  TE 4: HS stretch at stairs 4 x 15 sec  Balance/Neuromuscular Re-Education  NMR 1: PF/DF 30x blue TB  NMR 2: Ankle INV/EV with blue TB 30x  NMR 4: Cable TKE's 2 x 10 with 4 plates and 2-3 sh  NMR 7: SLS on foam 2 minutes  Therapeutic Activity  TA 1: Cybex HS curls 5 plates 3 x 10  TA 3: Cybex leg press bruce 8 plates 3 x 10  TA 6: step ups 2 x 10  TA 8: Heel raises from step 3 x 10 with 15 sec stretch between sets  TA 9: Seated CR 8 pl single leg 20x    Time Entry(in minutes):  Neuromuscular Re-Education Time Entry: 15  Therapeutic Activity Time Entry: 15  Therapeutic Exercise Time Entry: 13    Assessment & Plan   Assessment: Pt doing well today with R knee pain during session. R ankle mobility is doing well and strength is improving. Pt demonstrates good ankle strategy on foam pad with minimal UE support required. Pt has follow up with MD this week about his ankle/knee. Will continue to progress as able.  Evaluation/Treatment Tolerance: Patient tolerated treatment well    Patient will continue to benefit from skilled outpatient physical therapy to address the  deficits listed in the problem list box on initial evaluation, provide pt/family education and to maximize pt's level of independence in the home and community environment.     Patient's spiritual, cultural, and educational needs considered and patient agreeable to plan of care and goals.     Education  Education was done with Patient. The patient's learning style includes Demonstration and Listening. The patient Verbalizes understanding and Demonstrates understanding.                 Plan: Continue with current POC and advance as tolerated.    Goals:   Active       Ambulation/movement       Patient will walk >1000' with no pain, deviations or instability. (Progressing)       Start:  04/07/25    Expected End:  06/06/25               Functional outcome       Patient stated goal: To return to normal activities and work.  (Progressing)       Start:  04/07/25    Expected End:  06/06/25            Patient will demonstrate independence in home program for support of progression (Met)       Start:  04/07/25    Expected End:  04/25/25    Resolved:  04/21/25            Strength       Patient will achieve right ankle dorsiflexion strength of 4-/5 (Met)       Start:  04/07/25    Expected End:  06/06/25    Resolved:  04/21/25         Patient will achieve right ankle plantar flexion strength of 4/5 (Met)       Start:  04/07/25    Expected End:  06/06/25    Resolved:  04/21/25         Patient will achieve right ankle inversion strength of 4-/5 (Met)       Start:  04/07/25    Expected End:  06/06/25    Resolved:  04/21/25         Patient will achieve right ankle eversion strength of 4-/5 (Met)       Start:  04/07/25    Expected End:  06/06/25    Resolved:  04/21/25           Resolved       Pain       Patient will report no pain demonstrating a reduction of overall pain (Met)       Start:  04/07/25    Expected End:  06/06/25    Resolved:  04/21/25            Range of Motion       Patient will achieve right ankle dorsiflexion AROM 5  degrees (Met)       Start:  04/07/25    Expected End:  06/06/25    Resolved:  04/21/25         Patient will achieve right ankle plantar flexion AROM 40 degrees (Met)       Start:  04/07/25    Expected End:  06/06/25    Resolved:  04/21/25         Patient will achieve right ankle inversion AROM 20 degrees (Met)       Start:  04/07/25    Expected End:  06/06/25    Resolved:  04/21/25         Patient will achieve right ankle eversion AROM 20 degrees (Met)       Start:  04/07/25    Expected End:  06/06/25    Resolved:  04/21/25             Negar Reece PTA

## 2025-05-12 ENCOUNTER — CLINICAL SUPPORT (OUTPATIENT)
Dept: REHABILITATION | Facility: HOSPITAL | Age: 45
End: 2025-05-12
Payer: OTHER MISCELLANEOUS

## 2025-05-12 DIAGNOSIS — M25.671 STIFFNESS OF RIGHT ANKLE JOINT: ICD-10-CM

## 2025-05-12 DIAGNOSIS — S93.491D SPRAIN OF ANTERIOR TALOFIBULAR LIGAMENT OF RIGHT ANKLE, SUBSEQUENT ENCOUNTER: Primary | ICD-10-CM

## 2025-05-12 DIAGNOSIS — M25.661 DECREASED ROM OF RIGHT KNEE: ICD-10-CM

## 2025-05-12 DIAGNOSIS — R26.2 DIFFICULTY WALKING: ICD-10-CM

## 2025-05-12 DIAGNOSIS — M79.604 RIGHT LEG PAIN: ICD-10-CM

## 2025-05-12 PROCEDURE — 97110 THERAPEUTIC EXERCISES: CPT | Mod: CQ

## 2025-05-12 PROCEDURE — 97112 NEUROMUSCULAR REEDUCATION: CPT | Mod: CQ

## 2025-05-12 PROCEDURE — 97140 MANUAL THERAPY 1/> REGIONS: CPT | Mod: CQ

## 2025-05-12 NOTE — PROGRESS NOTES
"  Outpatient Rehab    Physical Therapy Visit    Patient Name: Iam Morillo  MRN: [id number]  YOB: 1980  Encounter Date: 5/12/2025    Therapy Diagnosis: No diagnosis found.  Physician: Yonatan Cross MD    Physician Orders: Eval and Treat  Medical Diagnosis: Encounter for orthopedic follow-up care    Visit # / Visits Authorized:  9 / 24  Insurance Authorization Period: 4/7/2025 to 4/1/2026  Date of Evaluation: 4/2/2025 4/7/2025  Plan of Care Certification: 4/7/2025 to 6/6/2025      PT/PTA: PTA   Number of PTA visits since last PT visit:2  Time In: 1045   Time Out: 1123  Total Time (in minutes): 38   Total Billable Time (in minutes): 38    FOTO:  Intake Score:  %  Survey Score 2:  %  Survey Score 3:  %    Precautions:       Subjective   No pain in the ankle..  Pain reported as 0/10.      Objective            Treatment:  Therapeutic Exercise  TE 1: Bike x 5 min  TE 2: SB stretch 4 x 15 sec  TE 3: Soleus Stretch at Step 4x20 sec  TE 4: HS stretch at stairs 4 x 15 sec  Balance/Neuromuscular Re-Education  NMR 4: Cable TKE's 2 x 10 with 4 plates and 2-3 sh  NMR 6: Hydrualic 4-way 2 x 10  NMR 7: SLS on foam 2 minutes  Therapeutic Activity  TA 1: Cybex HS curls 5 plates 3 x 10  TA 3: Cybex leg press bruce 8 plates 3 x 10  TA 6: step ups 2 x 10  TA 7: lateral step downs 6" 2 x 10  TA 8: Heel raises from step 3 x 10 with 15 sec stretch between sets    Time Entry(in minutes):  Manual Therapy Time Entry: 8  Neuromuscular Re-Education Time Entry: 20  Therapeutic Exercise Time Entry: 10    Assessment & Plan   Assessment: Pt doing well today with R knee pain during session. R ankle mobility is doing well and strength is improving. Pt demonstrates good ankle strategy on foam pad with minimal UE support required. Pt has surgery set up for 5/27 for menusic repail and possible ACL repair. He continue to have pain in knee and pooping. Will continue to progress as able.       Patient will continue to benefit from " skilled outpatient physical therapy to address the deficits listed in the problem list box on initial evaluation, provide pt/family education and to maximize pt's level of independence in the home and community environment.     Patient's spiritual, cultural, and educational needs considered and patient agreeable to plan of care and goals.     Education  Education was done with Patient. The patient's learning style includes Demonstration and Listening. The patient Verbalizes understanding and Demonstrates understanding.                 Plan: Continue with current POC and advance as tolerated.    Goals:   Active       Ambulation/movement       Patient will walk >1000' with no pain, deviations or instability. (Progressing)       Start:  04/07/25    Expected End:  06/06/25               Functional outcome       Patient stated goal: To return to normal activities and work.  (Progressing)       Start:  04/07/25    Expected End:  06/06/25            Patient will demonstrate independence in home program for support of progression (Met)       Start:  04/07/25    Expected End:  04/25/25    Resolved:  04/21/25            Strength       Patient will achieve right ankle dorsiflexion strength of 4-/5 (Met)       Start:  04/07/25    Expected End:  06/06/25    Resolved:  04/21/25         Patient will achieve right ankle plantar flexion strength of 4/5 (Met)       Start:  04/07/25    Expected End:  06/06/25    Resolved:  04/21/25         Patient will achieve right ankle inversion strength of 4-/5 (Met)       Start:  04/07/25    Expected End:  06/06/25    Resolved:  04/21/25         Patient will achieve right ankle eversion strength of 4-/5 (Met)       Start:  04/07/25    Expected End:  06/06/25    Resolved:  04/21/25           Resolved       Pain       Patient will report no pain demonstrating a reduction of overall pain (Met)       Start:  04/07/25    Expected End:  06/06/25    Resolved:  04/21/25            Range of Motion        Patient will achieve right ankle dorsiflexion AROM 5 degrees (Met)       Start:  04/07/25    Expected End:  06/06/25    Resolved:  04/21/25         Patient will achieve right ankle plantar flexion AROM 40 degrees (Met)       Start:  04/07/25    Expected End:  06/06/25    Resolved:  04/21/25         Patient will achieve right ankle inversion AROM 20 degrees (Met)       Start:  04/07/25    Expected End:  06/06/25    Resolved:  04/21/25         Patient will achieve right ankle eversion AROM 20 degrees (Met)       Start:  04/07/25    Expected End:  06/06/25    Resolved:  04/21/25             Negar Reece PTA

## 2025-05-14 ENCOUNTER — CLINICAL SUPPORT (OUTPATIENT)
Dept: REHABILITATION | Facility: HOSPITAL | Age: 45
End: 2025-05-14
Payer: OTHER MISCELLANEOUS

## 2025-05-14 DIAGNOSIS — Z98.890 S/P REPAIR OF LIGAMENT OF ANKLE: Primary | ICD-10-CM

## 2025-05-14 PROCEDURE — 97530 THERAPEUTIC ACTIVITIES: CPT

## 2025-05-14 PROCEDURE — 97110 THERAPEUTIC EXERCISES: CPT

## 2025-05-14 PROCEDURE — 97112 NEUROMUSCULAR REEDUCATION: CPT

## 2025-05-14 NOTE — PROGRESS NOTES
Outpatient Rehab    Physical Therapy Visit    Patient Name: Iam Morillo  MRN: [id number]  YOB: 1980  Encounter Date: 5/14/2025    Therapy Diagnosis:   Encounter Diagnosis   Name Primary?    S/P repair of ligament of ankle Yes     Physician: Yonatan Cross MD    Physician Orders: Eval and Treat  Medical Diagnosis: Encounter for orthopedic follow-up care    Visit # / Visits Authorized:  10 / 24  Insurance Authorization Period: 4/7/2025 to 4/1/2026  Date of Evaluation: 4/2/2025 4/7/2025  Plan of Care Certification: 4/7/2025 to 6/6/2025      PT/PTA: PT   Number of PTA visits since last PT visit:   Time In: 1127   Time Out: 1210  Total Time (in minutes): 43   Total Billable Time (in minutes): 40    FOTO:  Intake Score:  %  Survey Score 2:  %  Survey Score 3:  %    Precautions:       Subjective   Surgery scheduled for 5/27/2025. Pt reports that velcro tore on his knee brace so he can't wear it. He is getting new Velcro today..  Pain reported as 0/10. 4-5/10 pain in ankle    Objective            Treatment:  Therapeutic Exercise  TE 1: Bike x 5 min  TE 2: SB stretch 4 x 15 sec  TE 3: Soleus Stretch at Step 4x20 sec  TE 4: HS stretch at stairs 4 x 15 sec  TE 7: Prostretch 4 x 15 sec  Balance/Neuromuscular Re-Education  NMR 6: Hydrualic 4-way 2 x 10  NMR 7: SLS on disc 2 minutes  NMR 8: Heel walks x 2 laps  NMR 9: Toe walks x 2 laps  Therapeutic Activity  TA 8: Heel raises from step 3 x 10 with 15 sec stretch between sets/ single and bilateral.  TA 9: Seated CR 8 pl single leg 4 x 10    Time Entry(in minutes):  Neuromuscular Re-Education Time Entry: 20  Therapeutic Activity Time Entry: 10  Therapeutic Exercise Time Entry: 10    Assessment & Plan   Assessment: Pt reports that velcro tore on his knee brace so he can't wear it. He is getting new Velcro today. Treatment modified with focus on right ankle since pt doesn't have brace. Pt will have right knee surgery 5/27/2025.  Evaluation/Treatment Tolerance:  Patient tolerated treatment well    Patient will continue to benefit from skilled outpatient physical therapy to address the deficits listed in the problem list box on initial evaluation, provide pt/family education and to maximize pt's level of independence in the home and community environment.     Patient's spiritual, cultural, and educational needs considered and patient agreeable to plan of care and goals.           Plan: Continue with current POC and advance as tolerated.    Goals:   Active       Ambulation/movement       Patient will walk >1000' with no pain, deviations or instability. (Ongoing)       Start:  04/07/25    Expected End:  06/06/25               Functional outcome       Patient stated goal: To return to normal activities and work.  (Ongoing)       Start:  04/07/25    Expected End:  06/06/25            Patient will demonstrate independence in home program for support of progression (Met)       Start:  04/07/25    Expected End:  04/25/25    Resolved:  04/21/25            Strength       Patient will achieve right ankle dorsiflexion strength of 4-/5 (Met)       Start:  04/07/25    Expected End:  06/06/25    Resolved:  04/21/25         Patient will achieve right ankle plantar flexion strength of 4/5 (Met)       Start:  04/07/25    Expected End:  06/06/25    Resolved:  04/21/25         Patient will achieve right ankle inversion strength of 4-/5 (Met)       Start:  04/07/25    Expected End:  06/06/25    Resolved:  04/21/25         Patient will achieve right ankle eversion strength of 4-/5 (Met)       Start:  04/07/25    Expected End:  06/06/25    Resolved:  04/21/25           Resolved       Pain       Patient will report no pain demonstrating a reduction of overall pain (Met)       Start:  04/07/25    Expected End:  06/06/25    Resolved:  04/21/25            Range of Motion       Patient will achieve right ankle dorsiflexion AROM 5 degrees (Met)       Start:  04/07/25    Expected End:  06/06/25     Resolved:  04/21/25         Patient will achieve right ankle plantar flexion AROM 40 degrees (Met)       Start:  04/07/25    Expected End:  06/06/25    Resolved:  04/21/25         Patient will achieve right ankle inversion AROM 20 degrees (Met)       Start:  04/07/25    Expected End:  06/06/25    Resolved:  04/21/25         Patient will achieve right ankle eversion AROM 20 degrees (Met)       Start:  04/07/25    Expected End:  06/06/25    Resolved:  04/21/25             DAREN RAYMUNDO, PT

## 2025-05-16 ENCOUNTER — OFFICE VISIT (OUTPATIENT)
Dept: FAMILY MEDICINE | Facility: CLINIC | Age: 45
End: 2025-05-16
Payer: COMMERCIAL

## 2025-05-16 VITALS
SYSTOLIC BLOOD PRESSURE: 121 MMHG | OXYGEN SATURATION: 96 % | WEIGHT: 225 LBS | HEART RATE: 86 BPM | DIASTOLIC BLOOD PRESSURE: 82 MMHG | RESPIRATION RATE: 18 BRPM | BODY MASS INDEX: 30.48 KG/M2 | HEIGHT: 72 IN | TEMPERATURE: 98 F

## 2025-05-16 DIAGNOSIS — K64.1 GRADE II HEMORRHOIDS: Primary | ICD-10-CM

## 2025-05-16 RX ORDER — PRAMOXINE HYDROCHLORIDE HYDROCORTISONE ACETATE 100; 100 MG/10G; MG/10G
1 AEROSOL, FOAM TOPICAL 2 TIMES DAILY
Qty: 10 G | Refills: 1 | Status: SHIPPED | OUTPATIENT
Start: 2025-05-16

## 2025-05-16 RX ORDER — CIPROFLOXACIN 500 MG/1
500 TABLET, FILM COATED ORAL EVERY 12 HOURS
COMMUNITY
Start: 2025-05-08 | End: 2025-05-18

## 2025-05-16 RX ORDER — SILDENAFIL 100 MG/1
100 TABLET, FILM COATED ORAL
COMMUNITY
Start: 2025-05-08 | End: 2025-07-07

## 2025-05-16 RX ORDER — KETOROLAC TROMETHAMINE 30 MG/ML
60 INJECTION, SOLUTION INTRAMUSCULAR; INTRAVENOUS
Status: COMPLETED | OUTPATIENT
Start: 2025-05-16 | End: 2025-05-16

## 2025-05-16 RX ADMIN — KETOROLAC TROMETHAMINE 60 MG: 30 INJECTION, SOLUTION INTRAMUSCULAR; INTRAVENOUS at 03:05

## 2025-05-16 NOTE — PROGRESS NOTES
Melina Romero DNP, MELVI    [address] [place] [address]     PATIENT NAME: Iam Morillo  : 1980  DATE: 25  MRN: [id number]      Billing Provider: Melina Romero DNP, FNP  Level of Service:   Patient PCP Information       Provider PCP Type    To Obtain Unable General            Reason for Visit / Chief Complaint: Hemorrhoids       Update PCP  Update Chief Complaint         History of Present Illness / Problem Focused Workflow     Iam Morillo presents to the clinic with Hemorrhoids     Hx hemorrhoids. Has not tried any otc meds or meds for inflammation.     Review of Systems     Review of Systems   Constitutional:  Negative for activity change, appetite change, chills, fatigue and fever.   HENT:  Negative for nasal congestion, ear pain, hearing loss, postnasal drip and sore throat.    Respiratory:  Negative for cough, chest tightness, shortness of breath and wheezing.    Cardiovascular:  Negative for chest pain, palpitations, leg swelling and claudication.   Gastrointestinal:  Positive for rectal pain. Negative for abdominal pain, change in bowel habit, constipation, diarrhea, nausea and vomiting.   Genitourinary:  Negative for dysuria.   Musculoskeletal:  Negative for arthralgias, back pain, gait problem and myalgias.   Integumentary:  Negative for rash.   Neurological:  Negative for weakness and headaches.   Psychiatric/Behavioral:  Negative for suicidal ideas. The patient is not nervous/anxious.         Medical / Social / Family History     Past Medical History:   Diagnosis Date    Gout        Past Surgical History:   Procedure Laterality Date    REPAIR, LIGAMENT, COLLATERAL, ANKLE, SECONDARY Right 2025    Procedure: REPAIR, LIGAMENT, COLLATERAL, ANKLE, SECONDARY;  Surgeon: Yonatan Cross MD;  Location: [facility];  Service: Orthopedics;  Laterality: Right;       Social History  Mr. Iam Morillo  reports that he has been smoking  cigarettes. He started smoking about 25 years ago. He has a 6.3 pack-year smoking history. He has been exposed to tobacco smoke. His smokeless tobacco use includes snuff. He reports current alcohol use. He reports that he does not use drugs.    Family History  Mr. Iam Morillo's family history is not on file.    Medications and Allergies     Medications  Outpatient Medications Marked as Taking for the 5/16/25 encounter (Office Visit) with Melina Romero DNP, FNP   Medication Sig Dispense Refill    allopurinoL (ZYLOPRIM) 300 MG tablet Take 300 mg by mouth.      chlorpheniramine-phenyleph-DM 4-10-15 mg/5 mL Liqd Take 5 mLs by mouth.      ciprofloxacin HCl (CIPRO) 500 MG tablet Take 500 mg by mouth every 12 (twelve) hours.      colchicine (COLCRYS) 0.6 mg tablet Take 1 tablet (0.6 mg total) by mouth once daily. 20 tablet 2    sildenafiL (VIAGRA) 100 MG tablet Take 100 mg by mouth as needed.       Current Facility-Administered Medications for the 5/16/25 encounter (Office Visit) with Melina Romero DNP, FNP   Medication Dose Route Frequency Provider Last Rate Last Admin    [COMPLETED] ketorolac injection 60 mg  60 mg Intramuscular 1 time in Clinic/HOD Melina Romero DNP, FNP   60 mg at 05/16/25 1537       Allergies  Review of patient's allergies indicates:  No Known Allergies    Physical Examination     Vitals:    05/16/25 1502   BP: 121/82   BP Location: Left arm   Patient Position: Sitting   Pulse: 86   Resp: 18   Temp: 98.4 °F (36.9 °C)   TempSrc: Oral   SpO2: 96%   Weight: 102.1 kg (225 lb)   Height: 6' (1.829 m)     Physical Exam  Vitals and nursing note reviewed.   Constitutional:       General: He is not in acute distress.     Appearance: Normal appearance. He is normal weight. He is not ill-appearing.   HENT:      Mouth/Throat:      Mouth: Mucous membranes are moist.   Eyes:      Extraocular Movements: Extraocular movements intact.      Pupils: Pupils are equal, round, and reactive to light.    Cardiovascular:      Rate and Rhythm: Normal rate and regular rhythm.      Pulses: Normal pulses.      Heart sounds: Normal heart sounds. No murmur heard.  Pulmonary:      Effort: Pulmonary effort is normal. No respiratory distress.      Breath sounds: Normal breath sounds. No wheezing, rhonchi or rales.   Chest:      Chest wall: No tenderness.   Abdominal:      General: Bowel sounds are normal. There is no distension.      Palpations: Abdomen is soft.      Tenderness: There is no abdominal tenderness. There is no right CVA tenderness, left CVA tenderness, guarding or rebound.   Genitourinary:     Rectum: External hemorrhoid present.          Comments: Grade 2 hemorrhoids  Musculoskeletal:         General: No swelling or tenderness. Normal range of motion.      Cervical back: Normal range of motion and neck supple.      Right lower leg: No edema.      Left lower leg: No edema.   Skin:     General: Skin is warm and dry.      Findings: No rash.   Neurological:      General: No focal deficit present.      Mental Status: He is alert and oriented to person, place, and time. Mental status is at baseline.   Psychiatric:         Mood and Affect: Mood normal.         Behavior: Behavior normal.         Thought Content: Thought content normal.         Judgment: Judgment normal.          Assessment and Plan (including Health Maintenance)      Problem List  Smart Sets  Document Outside HM   :    Plan:         Health Maintenance Due   Topic Date Due    Hepatitis C Screening  Never done    HIV Screening  Never done    TETANUS VACCINE  Never done    Pneumococcal Vaccines (Age 0-49) (1 of 2 - PCV) Never done    Hemoglobin A1c (Diabetic Prevention Screening)  Never done    COVID-19 Vaccine (1 - 2024-25 season) Never done       Problem List Items Addressed This Visit    None  Visit Diagnoses         Grade II hemorrhoids    -  Primary    Relevant Medications    ketorolac injection 60 mg (Completed)    hydrocortisone-pramoxine  (PROCTOFOAM HC) rectal foam          Grade II hemorrhoids  -     ketorolac injection 60 mg  -     hydrocortisone-pramoxine (PROCTOFOAM HC) rectal foam; Place 1 applicator rectally 2 (two) times daily.  Dispense: 10 g; Refill: 1       Health Maintenance Topics with due status: Not Due       Topic Last Completion Date    Lipid Panel 05/01/2025    Influenza Vaccine Not Due    RSV Vaccine (Age 60+ and Pregnant patients) Not Due         Future Appointments   Date Time Provider Department Center   5/19/2025 10:45 AM Negar Reece PTA [facility]   5/22/2025 10:45 AM Wilberto Quezada, PT [facility]   5/29/2025 10:45 AM Wilberto Quezada, PT [facility]   6/2/2025 10:45 AM Vishnu Potts PTA [facility]   6/5/2025 10:45 AM Wilberto Quezada, PT [facility]        Follow up if symptoms worsen or fail to improve.     Signature:  Melina Romero DNP, FNP  [address] [place] [address]  Phone #: [phone]  Fax #: [phone]    Date of encounter: 5/16/25    Patient Instructions   Warm soaks to perineal area. Use cream 2 times a day. Ibuprofen/Aleve as needed for pain/inflammation. Follow up with primary care provider if no improvement.

## 2025-05-16 NOTE — PATIENT INSTRUCTIONS
Warm soaks to perineal area. Use cream 2 times a day. Ibuprofen/Aleve as needed for pain/inflammation. Follow up with primary care provider if no improvement.

## 2025-05-19 ENCOUNTER — CLINICAL SUPPORT (OUTPATIENT)
Dept: REHABILITATION | Facility: HOSPITAL | Age: 45
End: 2025-05-19
Payer: OTHER MISCELLANEOUS

## 2025-05-19 DIAGNOSIS — Z98.890 S/P REPAIR OF LIGAMENT OF ANKLE: Primary | ICD-10-CM

## 2025-05-19 PROCEDURE — 97110 THERAPEUTIC EXERCISES: CPT | Mod: CQ

## 2025-05-19 PROCEDURE — 97112 NEUROMUSCULAR REEDUCATION: CPT | Mod: CQ

## 2025-05-19 NOTE — PROGRESS NOTES
"  Outpatient Rehab    Physical Therapy Visit    Patient Name: Iam Morillo  MRN: [id number]  YOB: 1980  Encounter Date: 5/19/2025    Therapy Diagnosis: No diagnosis found.  Physician: Yonatan Cross MD    Physician Orders: Eval and Treat  Medical Diagnosis: Encounter for orthopedic follow-up care    Visit # / Visits Authorized:  11 / 24  Insurance Authorization Period: 4/7/2025 to 4/1/2026  Date of Evaluation: 4/7/2025  Plan of Care Certification: 4/7/2025 to 6/6/2025      PT/PTA: PTA   Number of PTA visits since last PT visit:3  Time In: 1045   Time Out: 1120  Total Time (in minutes): 35   Total Billable Time (in minutes): 35    FOTO:  Intake Score: 51%  Survey Score 2: 85%  Survey Score 3:  %    Precautions:       Subjective   Surgery scheduled for 5/27/2025. No pain in ankle today just knee..  Pain reported as 0/10.      Objective            Treatment:  Therapeutic Exercise  TE 1: Bike x 5 min  TE 2: SB stretch 4 x 15 sec  TE 3: Soleus Stretch at Step 3 x 30 sec  TE 4: HS stretch at stairs 4 x 15 sec  TE 7: Prostretch 4 x 15 sec  Therapeutic Activity  TA 1: Cybex HS curls 5 plates 3 x 10 - add weight next visit  TA 3: Cybex leg press bruce 8 plates 3 x 10  TA 6: step ups 2 x 10  TA 7: lateral step downs 6" 2 x 10  TA 8: Heel raises from step 3 x 10 with 15 sec stretch between sets/ single and bilateral.  TA 9: Seated CR 8 pl single leg 4 x 10    Time Entry(in minutes):  Neuromuscular Re-Education Time Entry: 25  Therapeutic Exercise Time Entry: 10    Assessment & Plan   Assessment: Abhishek with good effort throughout treatment session. His ankle is doing well but continue to have increased pain in knee. Surgery on knee is set for 5/27/2025. Abhishek FOTO score measure 85. will progress abhishek as tolerated.  Evaluation/Treatment Tolerance: Patient tolerated treatment well    Patient will continue to benefit from skilled outpatient physical therapy to address the deficits listed in the problem list " box on initial evaluation, provide pt/family education and to maximize pt's level of independence in the home and community environment.     Patient's spiritual, cultural, and educational needs considered and patient agreeable to plan of care and goals.     Education  Education was done with Patient. The patient's learning style includes Demonstration and Listening. The patient Verbalizes understanding and Demonstrates understanding.                 Plan: Continue with current POC and advance as tolerated.    Goals:     Negar Reece, PTA

## 2025-05-21 ENCOUNTER — TELEPHONE (OUTPATIENT)
Dept: ORTHOPEDICS | Facility: CLINIC | Age: 45
End: 2025-05-21
Payer: COMMERCIAL

## 2025-05-21 NOTE — TELEPHONE ENCOUNTER
Copied from CRM #[id number]. Topic: General Inquiry - Patient Advice  >> May 21, 2025 10:57 AM Dilip wrote:  Who Called: Iam Morillo    Pt would like a nurse to give him a call asap he is stating he found out why his surgery got denied     Preferred Method of Contact: Phone Call  Patient's Preferred Phone Number on File: [phone]   Best Call Back Number, if different:  Additional Information:

## 2025-05-22 ENCOUNTER — CLINICAL SUPPORT (OUTPATIENT)
Dept: REHABILITATION | Facility: HOSPITAL | Age: 45
End: 2025-05-22
Payer: OTHER MISCELLANEOUS

## 2025-05-22 DIAGNOSIS — Z98.890 S/P REPAIR OF LIGAMENT OF ANKLE: Primary | ICD-10-CM

## 2025-05-22 PROCEDURE — 97530 THERAPEUTIC ACTIVITIES: CPT

## 2025-05-22 PROCEDURE — 97110 THERAPEUTIC EXERCISES: CPT

## 2025-05-22 PROCEDURE — 97112 NEUROMUSCULAR REEDUCATION: CPT

## 2025-05-22 NOTE — PROGRESS NOTES
"  Outpatient Rehab    Physical Therapy Visit    Patient Name: Iam Morillo  MRN: [id number]  YOB: 1980  Encounter Date: 5/22/2025    Therapy Diagnosis:   Encounter Diagnosis   Name Primary?    S/P repair of ligament of ankle Yes     Physician: Yonatan Cross MD    Physician Orders: Eval and Treat  Medical Diagnosis: Encounter for orthopedic follow-up care    Visit # / Visits Authorized:  12 / 24  Insurance Authorization Period: 4/7/2025 to 4/1/2026  Date of Evaluation: 4/2/2025 4/7/2025  Plan of Care Certification: 4/7/2025 to 6/6/2025      PT/PTA: PT   Number of PTA visits since last PT visit:   Time In: 1050   Time Out: 1130  Total Time (in minutes): 40   Total Billable Time (in minutes): 40    FOTO:  Intake Score:  %  Survey Score 2:  %  Survey Score 3:  %    Precautions:       Subjective   Pt reports that his surgery was cancelled and he is working on getting it rescheduled..  Pain reported as 0/10. 4-5/10 pain right knee    Objective            Treatment:  Therapeutic Exercise  TE 1: Bike x 5 min  TE 2: SB stretch 4 x 15 sec  TE 3: Soleus Stretch at Step 3 x 30 sec  TE 4: HS stretch at stairs 4 x 15 sec  TE 7: Prostretch 4 x 15 sec  Balance/Neuromuscular Re-Education  NMR 6: Hydrualic 4-way 2 x 10  NMR 7: SLS on disc 2 minutes  NMR 8: Heel walks x 2 laps  NMR 9: Toe walks x 2 laps  Therapeutic Activity  TA 1: Cybex HS curls 7 plates 3 x 10  TA 4: FSU on 6 inch step 2 x 10  TA 5: Squats 2x10  TA 6: step ups 2 x 10  TA 7: lateral step downs 6" 2 x 10  TA 8: Heel raises from step 3 x 10 with 15 sec stretch between sets/ single and bilateral.  TA 9: Seated CR 8 pl single leg 4 x 10    Time Entry(in minutes):  Neuromuscular Re-Education Time Entry: 15  Therapeutic Activity Time Entry: 15  Therapeutic Exercise Time Entry: 10    Assessment & Plan   Assessment: Pt reports that they had to reschedule his knee surgery with no date set yet due to mix up with papaerwork. Pt has done great with his ankle " rehab but continues to be hindered by right knee derangement requiring surgical repair. Pt is very anxious to return to work and just wants to get everything fix so he can recover. Continue to progress ankle strength and stability exercises along with knee exercises that don't aggrevate the joint too much.  Evaluation/Treatment Tolerance: Patient tolerated treatment well    The patient will continue to benefit from skilled outpatient physical therapy in order to address the deficits listed in the problem list on the initial evaluation, provide patient and family education, and maximize the patients level of independence in the home and community environments.     The patient's spiritual, cultural, and educational needs were considered, and the patient is agreeable to the plan of care and goals.           Plan: Continue with current POC and advance as tolerated.    Goals:   Active       Ambulation/movement       Patient will walk >1000' with no pain, deviations or instability. (Progressing)       Start:  04/07/25    Expected End:  06/06/25               Functional outcome       Patient stated goal: To return to normal activities and work.  (Progressing)       Start:  04/07/25    Expected End:  06/06/25            Patient will demonstrate independence in home program for support of progression (Met)       Start:  04/07/25    Expected End:  04/25/25    Resolved:  04/21/25            Strength       Patient will achieve right ankle dorsiflexion strength of 4-/5 (Met)       Start:  04/07/25    Expected End:  06/06/25    Resolved:  04/21/25         Patient will achieve right ankle plantar flexion strength of 4/5 (Met)       Start:  04/07/25    Expected End:  06/06/25    Resolved:  04/21/25         Patient will achieve right ankle inversion strength of 4-/5 (Met)       Start:  04/07/25    Expected End:  06/06/25    Resolved:  04/21/25         Patient will achieve right ankle eversion strength of 4-/5 (Met)       Start:   04/07/25    Expected End:  06/06/25    Resolved:  04/21/25           Resolved       Pain       Patient will report no pain demonstrating a reduction of overall pain (Met)       Start:  04/07/25    Expected End:  06/06/25    Resolved:  04/21/25            Range of Motion       Patient will achieve right ankle dorsiflexion AROM 5 degrees (Met)       Start:  04/07/25    Expected End:  06/06/25    Resolved:  04/21/25         Patient will achieve right ankle plantar flexion AROM 40 degrees (Met)       Start:  04/07/25    Expected End:  06/06/25    Resolved:  04/21/25         Patient will achieve right ankle inversion AROM 20 degrees (Met)       Start:  04/07/25    Expected End:  06/06/25    Resolved:  04/21/25         Patient will achieve right ankle eversion AROM 20 degrees (Met)       Start:  04/07/25    Expected End:  06/06/25    Resolved:  04/21/25             DAREN RAYMUNDO, PT

## 2025-05-28 ENCOUNTER — TELEPHONE (OUTPATIENT)
Dept: ORTHOPEDICS | Facility: CLINIC | Age: 45
End: 2025-05-28
Payer: COMMERCIAL

## 2025-05-28 NOTE — TELEPHONE ENCOUNTER
Copied from CRM #[id number]. Topic: General Inquiry - Patient Advice  >> May 28, 2025 12:57 PM Dilip wrote:  Who Called: Iam Morillo    Pt is asking if a nurse or dr barnhart could call him back at the number below. His surgery was denied bc there was no proof that he had physical therapy on his knee, he wants to know if anyone has heard anything about his surgery status     Preferred Method of Contact: Phone Call  Patient's Preferred Phone Number on File: [phone]   Best Call Back Number, if different:  Additional Information:  >> May 28, 2025  2:45 PM Maria Antonia wrote:  Message sent to [facility]

## 2025-05-28 NOTE — LETTER
Fax Transmission                                                                                                                                                       Date: May 30, 2025       To:  Lucia @ [place] From: Yahir @ Dr. Cross's office   Fax:  [phone] Fax: [phone]   Phone:  [phone] Phone: [phone]     Attached is office notes, PT notes, MRI report on patient   Iam Morillo : 1980  Claim#[id number]  DOI:2024                            CONFIDENTIALITY NOTICE:  The documents accompanying this telecopy transmission contain confidential information belonging to the sender. The information is intended only for the use of the individual or entity named above. If you are not the intended recipient you are hereby notified that any disclosure, copying, distribution or taking of any action in reliance on the contents of this telecopied information is strictly prohibited. If you have received this telecopy in error, please immediately notify this office by telephone at [phone].

## 2025-05-29 ENCOUNTER — CLINICAL SUPPORT (OUTPATIENT)
Dept: REHABILITATION | Facility: HOSPITAL | Age: 45
End: 2025-05-29
Payer: OTHER MISCELLANEOUS

## 2025-05-29 DIAGNOSIS — Z98.890 S/P REPAIR OF LIGAMENT OF ANKLE: Primary | ICD-10-CM

## 2025-05-29 PROCEDURE — 97530 THERAPEUTIC ACTIVITIES: CPT

## 2025-05-29 PROCEDURE — 97112 NEUROMUSCULAR REEDUCATION: CPT

## 2025-05-29 PROCEDURE — 97110 THERAPEUTIC EXERCISES: CPT

## 2025-05-29 NOTE — PROGRESS NOTES
"  Outpatient Rehab    Physical Therapy Progress Note    Patient Name: Iam Morillo  MRN: [id number]  YOB: 1980  Encounter Date: 5/29/2025    Therapy Diagnosis:   Encounter Diagnosis   Name Primary?    S/P repair of ligament of ankle Yes     Physician: Yonatan Cross MD    Physician Orders: Eval and Treat  Medical Diagnosis: Encounter for orthopedic follow-up care    Visit # / Visits Authorized:  13 / 24  Insurance Authorization Period: 4/7/2025 to 4/1/2026  Date of Evaluation: 4/2/2025  Plan of Care Certification:       PT/PTA: PT   Number of PTA visits since last PT visit:0  Time In: 1046   Time Out: 1132  Total Time (in minutes): 46   Total Billable Time (in minutes): 45    FOTO:  Intake Score:  %  Survey Score 2:  %  Survey Score 3:  %    Precautions:       Subjective   Ankle is feeling really good but the knee is still painful. Surgery for the knee has still not been rescheduled yet..  Pain reported as 0/10. 4-5/10 pain right knee; 0/10 right ankle    Objective            Treatment:  Therapeutic Exercise  TE 1: Bike x 5 min  TE 2: SB stretch 4 x 15 sec  TE 3: Soleus Stretch at Step 3 x 30 sec  TE 4: HS stretch at stairs 4 x 15 sec  TE 7: Prostretch 4 x 15 sec  Balance/Neuromuscular Re-Education  NMR 1: PF/DF 30x blue TB  NMR 6: Hydrualic 4-way 2 x 10  NMR 7: SLS on disc 2 minutes  NMR 8: Heel walks x 2 laps  NMR 9: Toe walks x 2 laps  Therapeutic Activity  TA 1: Cybex HS curls 7 plates 3 x 10  TA 3: Cybex leg press bruce 8 plates 3 x 10  TA 4: FSU on 6 inch step 2 x 10  TA 5: Squats 2x10  TA 6: step ups 2 x 10  TA 7: lateral step downs 6" 2 x 10  TA 8: Heel raises from step 3 x 10 with 15 sec stretch between sets/ single and bilateral.  TA 9: Seated Calf Raises  8 pl single leg 4 x 10    Time Entry(in minutes):  Neuromuscular Re-Education Time Entry: 12  Therapeutic Activity Time Entry: 23  Therapeutic Exercise Time Entry: 10    Assessment & Plan   Assessment: Patient presents to therapy today " with Right Ankle feeling good today. His main limitation is the Right Knee. The surgery for the Right Knee has still not been rescheduled. Therapist is working with patient on strengthening the Right Ankle and the Right Knee. He tolerated all exercises as listed above for the ankle but knee pain increases with activity. He will need surgery as soon as possible for the knee. We will continue to progress him as tolerated.  Evaluation/Treatment Tolerance: Patient limited by pain    The patient will continue to benefit from skilled outpatient physical therapy in order to address the deficits listed in the problem list on the initial evaluation, provide patient and family education, and maximize the patients level of independence in the home and community environments.     The patient's spiritual, cultural, and educational needs were considered, and the patient is agreeable to the plan of care and goals.           Plan: Continue with current POC and advance as tolerated. Sup Visit performed today with MIKE Waller and MIKE Covington.  All goals and treatment plan reviewed. Will work toward completion of all goals set.    Goals:   Active       Ambulation/movement       Patient will walk >1000' with no pain, deviations or instability. (Progressing)       Start:  04/07/25    Expected End:  06/06/25               Functional outcome       Patient stated goal: To return to normal activities and work.  (Progressing)       Start:  04/07/25    Expected End:  06/06/25            Patient will demonstrate independence in home program for support of progression (Met)       Start:  04/07/25    Expected End:  04/25/25    Resolved:  04/21/25            Strength       Patient will achieve right ankle dorsiflexion strength of 4-/5 (Met)       Start:  04/07/25    Expected End:  06/06/25    Resolved:  04/21/25         Patient will achieve right ankle plantar flexion strength of 4/5 (Met)       Start:  04/07/25    Expected End:   06/06/25    Resolved:  04/21/25         Patient will achieve right ankle inversion strength of 4-/5 (Met)       Start:  04/07/25    Expected End:  06/06/25    Resolved:  04/21/25         Patient will achieve right ankle eversion strength of 4-/5 (Met)       Start:  04/07/25    Expected End:  06/06/25    Resolved:  04/21/25           Resolved       Pain       Patient will report no pain demonstrating a reduction of overall pain (Met)       Start:  04/07/25    Expected End:  06/06/25    Resolved:  04/21/25            Range of Motion       Patient will achieve right ankle dorsiflexion AROM 5 degrees (Met)       Start:  04/07/25    Expected End:  06/06/25    Resolved:  04/21/25         Patient will achieve right ankle plantar flexion AROM 40 degrees (Met)       Start:  04/07/25    Expected End:  06/06/25    Resolved:  04/21/25         Patient will achieve right ankle inversion AROM 20 degrees (Met)       Start:  04/07/25    Expected End:  06/06/25    Resolved:  04/21/25         Patient will achieve right ankle eversion AROM 20 degrees (Met)       Start:  04/07/25    Expected End:  06/06/25    Resolved:  04/21/25             IGLESIA RAYMUNDO, PT, DPT

## 2025-05-30 NOTE — TELEPHONE ENCOUNTER
Talked with Lula at [facility] and gave her dates and times when Dr. Cross was in the office and available for Peer to Peer. I also resubmitted office notes, PT notes and MRI report to Lucia. Fax#[phone]

## 2025-06-02 ENCOUNTER — TELEPHONE (OUTPATIENT)
Dept: ORTHOPEDICS | Facility: CLINIC | Age: 45
End: 2025-06-02
Payer: COMMERCIAL

## 2025-06-05 ENCOUNTER — CLINICAL SUPPORT (OUTPATIENT)
Dept: REHABILITATION | Facility: HOSPITAL | Age: 45
End: 2025-06-05
Payer: OTHER MISCELLANEOUS

## 2025-06-05 ENCOUNTER — TELEPHONE (OUTPATIENT)
Dept: ORTHOPEDICS | Facility: CLINIC | Age: 45
End: 2025-06-05
Payer: COMMERCIAL

## 2025-06-05 DIAGNOSIS — M25.561 ACUTE PAIN OF RIGHT KNEE: Primary | ICD-10-CM

## 2025-06-05 PROCEDURE — 97112 NEUROMUSCULAR REEDUCATION: CPT

## 2025-06-05 PROCEDURE — 97110 THERAPEUTIC EXERCISES: CPT

## 2025-06-05 PROCEDURE — 97530 THERAPEUTIC ACTIVITIES: CPT

## 2025-06-05 NOTE — TELEPHONE ENCOUNTER
Copied from CRM #[id number]. Topic: Appointments - Appointment Scheduling  >> Jun 5, 2025  1:02 PM Dilip wrote:  Who Called: Iam Morillo    Caller is requesting a sooner appointment. Caller declined first available appointment listed below. Caller will not accept being placed on the waitlist and is requesting a message be sent to doctor.    When is the first available appointment? June 30   Options offered (Virtual Visit, Urgent Care):  pt wants to see barnhart   Symptoms: Pt is needing a follow up darnell since he has finished his PT pt states this is WC       Preferred Method of Contact: Phone Call  Patient's Preferred Phone Number on File: [phone]   Best Call Back Number, if different:  Additional Information:

## 2025-06-17 ENCOUNTER — TELEPHONE (OUTPATIENT)
Dept: ORTHOPEDICS | Facility: CLINIC | Age: 45
End: 2025-06-17
Payer: COMMERCIAL

## 2025-06-17 NOTE — TELEPHONE ENCOUNTER
Copied from CRM #[id number]. Topic: General Inquiry - Patient Advice  >> Jun 17, 2025 11:33 AM Geeta Rodriguez wrote:  Who Called: Iam Morillo    Caller is requesting assistance/information from provider's office.        Preferred Method of Contact: Phone Call  Patient's Preferred Phone Number on File: [phone]   Best Call Back Number, if different:  Additional Information: Patient would like to speak with Pat regarding a email his  sent to her about his [employer] CLAIM.

## 2025-06-20 NOTE — TELEPHONE ENCOUNTER
Advised patient of his appt on 06/25/25 @ 2:00p.m. to address MMI for his right ankle.  He stated that  is sending him to see another doctor for his right knee.

## 2025-06-25 ENCOUNTER — OFFICE VISIT (OUTPATIENT)
Dept: ORTHOPEDICS | Facility: CLINIC | Age: 45
End: 2025-06-25
Payer: OTHER MISCELLANEOUS

## 2025-06-25 VITALS
HEART RATE: 82 BPM | WEIGHT: 230.38 LBS | OXYGEN SATURATION: 97 % | SYSTOLIC BLOOD PRESSURE: 128 MMHG | BODY MASS INDEX: 31.2 KG/M2 | HEIGHT: 72 IN | DIASTOLIC BLOOD PRESSURE: 85 MMHG

## 2025-06-25 DIAGNOSIS — S93.409A COMPLETE TEAR OF LIGAMENT OF ANKLE: Primary | ICD-10-CM

## 2025-06-25 PROCEDURE — 99999 PR PBB SHADOW E&M-EST. PATIENT-LVL III: CPT | Mod: PBBFAC,,, | Performed by: ORTHOPAEDIC SURGERY

## 2025-06-25 PROCEDURE — 99213 OFFICE O/P EST LOW 20 MIN: CPT | Mod: PBBFAC | Performed by: ORTHOPAEDIC SURGERY

## 2025-06-25 NOTE — LETTER
June 26, 2025      [facility]  [address] [facility] [address]  Phone: [phone]  Fax: [phone]       Patient: Iam Morillo   YOB: 1980  Date of Visit: 06/25/2025    To Whom It May Concern:    Kit Morillo  was at [facility] on 06/25/2025. The patient may return to work/school on 07/07/2025 with restrictions no lifting, pulling or pushing over 20 pounds secondary to knee pain. If you have any questions or concerns, or if I can be of further assistance, please do not hesitate to contact me.    Sincerely,    MD Yahir Pop MA

## 2025-06-25 NOTE — PROGRESS NOTES
Patient is here follow-up of the right ankle ligament repair he is doing well from of the ankle he has full motion swelling is decreasing I am going to put him in MMI for of the ankle no restrictions.  In his far as his knee is concerned on the MRI that he had signal increased in his medial meniscus did not clearly extend into the joint.  Patient has had an injection that gave him temporary relief but he has started having mechanical symptoms in his knee again.  He has done physical therapy the same time he is doing therapy on in his ankle he is doing on in his knee so he has had over 3 months of physical therapy and a knee without full relief the symptoms.  Anti-inflammatories did not give him full relief the symptoms.  At this time we had planned on doing an arthroscopy of the knee but this is not being approved at this time.  Patient has been through physical therapy injections still having mechanical symptoms in his knee the MRI shows increased signal in the medial meniscus with a normal lateral meniscus.  This time he is having mechanical symptoms.  I still recommend an arthroscopy of the knee.  He has finished his physical therapy.  MMI for of the ankle.  Follow-up PRN for the ankle.  We will plan surgical intervention of the knee once it gets approved by his work comp.

## 2025-06-26 ENCOUNTER — TELEPHONE (OUTPATIENT)
Dept: ORTHOPEDICS | Facility: CLINIC | Age: 45
End: 2025-06-26
Payer: COMMERCIAL

## 2025-06-26 NOTE — TELEPHONE ENCOUNTER
Copied from CRM #[id number]. Topic: General Inquiry - Patient Advice  >> Jun 26, 2025  2:24 PM Med Assistant Linda wrote:  Who Called: Iam Morillo    Caller is requesting assistance/information from provider's office.    Preferred Method of Contact: Phone Call  Patient's Preferred Phone Number on File: [phone]   Best Call Back Number, if different:  Additional Information: send email to w/c about his ankle, they need notes from yesterday visit

## (undated) DEVICE — Device

## (undated) DEVICE — SUT ETHILON 3-0 PS2 18 BLK

## (undated) DEVICE — NDL SURGEON MAYO #7 2/PK 72PKS

## (undated) DEVICE — GLOVE SENSICARE PI GRN 6.5

## (undated) DEVICE — STOCKINETTE TUBULAR 2PL 6 X 4

## (undated) DEVICE — CAST LARGE OR FROM CAST CART

## (undated) DEVICE — APPLICATOR CHLORAPREP ORN 26ML

## (undated) DEVICE — SOL NACL IRR 1000ML BTL

## (undated) DEVICE — PAD CAST SPECIALIST STRL 3

## (undated) DEVICE — GLOVE SENSICARE PI SURG 6.5

## (undated) DEVICE — TIP YANKAUERS BULB NO VENT

## (undated) DEVICE — SUT 2-0 VICRYL / CT-1

## (undated) DEVICE — BANDAGE ESMARK 6INX3YD

## (undated) DEVICE — SPONGE COTTON TRAY 4X4IN

## (undated) DEVICE — PENCIL SMK EVAC CONNECTOR 10FT

## (undated) DEVICE — GOWN NONREINF SET-IN SLV XL

## (undated) DEVICE — GOWN NONREINF SET-IN SLV 2XL

## (undated) DEVICE — GLOVE SENSICARE PI SURG 8.5

## (undated) DEVICE — GLOVE SENSICARE PI GRN 8.5

## (undated) DEVICE — GLOVE SENSICARE PI SURG 7

## (undated) DEVICE — TOURNIQUET SB QC SP 34X4IN